# Patient Record
Sex: FEMALE | Race: OTHER | HISPANIC OR LATINO | URBAN - METROPOLITAN AREA
[De-identification: names, ages, dates, MRNs, and addresses within clinical notes are randomized per-mention and may not be internally consistent; named-entity substitution may affect disease eponyms.]

---

## 2019-12-31 ENCOUNTER — OUTPATIENT (OUTPATIENT)
Dept: OUTPATIENT SERVICES | Facility: HOSPITAL | Age: 19
LOS: 1 days | Discharge: HOME | End: 2019-12-31

## 2019-12-31 ENCOUNTER — APPOINTMENT (OUTPATIENT)
Dept: SPEECH THERAPY | Facility: CLINIC | Age: 19
End: 2019-12-31

## 2020-01-14 DIAGNOSIS — H90.3 SENSORINEURAL HEARING LOSS, BILATERAL: ICD-10-CM

## 2020-08-11 ENCOUNTER — APPOINTMENT (OUTPATIENT)
Dept: SURGERY | Facility: HOSPITAL | Age: 20
End: 2020-08-11

## 2020-09-08 ENCOUNTER — APPOINTMENT (OUTPATIENT)
Dept: SURGERY | Facility: HOSPITAL | Age: 20
End: 2020-09-08

## 2020-09-08 ENCOUNTER — OUTPATIENT (OUTPATIENT)
Dept: OUTPATIENT SERVICES | Facility: HOSPITAL | Age: 20
LOS: 1 days | End: 2020-09-08

## 2020-09-08 VITALS
HEART RATE: 74 BPM | SYSTOLIC BLOOD PRESSURE: 133 MMHG | BODY MASS INDEX: 23.19 KG/M2 | TEMPERATURE: 98.4 F | DIASTOLIC BLOOD PRESSURE: 64 MMHG | WEIGHT: 126 LBS | HEIGHT: 62 IN

## 2020-09-08 DIAGNOSIS — R10.12 LEFT UPPER QUADRANT PAIN: ICD-10-CM

## 2020-09-08 DIAGNOSIS — Z78.9 OTHER SPECIFIED HEALTH STATUS: ICD-10-CM

## 2020-09-08 DIAGNOSIS — R10.9 UNSPECIFIED ABDOMINAL PAIN: ICD-10-CM

## 2020-09-08 DIAGNOSIS — H90.5 UNSPECIFIED SENSORINEURAL HEARING LOSS: ICD-10-CM

## 2020-09-08 DIAGNOSIS — Q23.0 CONGENITAL STENOSIS OF AORTIC VALVE: ICD-10-CM

## 2021-01-27 ENCOUNTER — EMERGENCY (EMERGENCY)
Facility: HOSPITAL | Age: 21
LOS: 1 days | Discharge: HOME | End: 2021-01-27
Attending: EMERGENCY MEDICINE | Admitting: EMERGENCY MEDICINE
Payer: MEDICAID

## 2021-01-27 VITALS
OXYGEN SATURATION: 100 % | HEART RATE: 93 BPM | TEMPERATURE: 98 F | DIASTOLIC BLOOD PRESSURE: 79 MMHG | SYSTOLIC BLOOD PRESSURE: 154 MMHG | RESPIRATION RATE: 18 BRPM

## 2021-01-27 PROCEDURE — 99283 EMERGENCY DEPT VISIT LOW MDM: CPT | Mod: 25

## 2021-01-27 PROCEDURE — 56405 I&D VULVA/PERINEAL ABSCESS: CPT

## 2021-01-27 RX ORDER — CEPHALEXIN 500 MG
10 CAPSULE ORAL
Qty: 140 | Refills: 0
Start: 2021-01-27 | End: 2021-02-02

## 2021-01-27 RX ORDER — CEPHALEXIN 500 MG
500 CAPSULE ORAL ONCE
Refills: 0 | Status: DISCONTINUED | OUTPATIENT
Start: 2021-01-27 | End: 2021-01-27

## 2021-01-27 NOTE — ED ADULT TRIAGE NOTE - CHIEF COMPLAINT QUOTE
Pt c/o 2 cysts inside the vagina X2-3 days. Denies vaginal discharge & vaginal bleedings. Denies fevers, chills. Endorses pain.

## 2021-01-27 NOTE — ED PROVIDER NOTE - CLINICAL SUMMARY MEDICAL DECISION MAKING FREE TEXT BOX
minor cellulitis abscess will perform I&D, Yoselin sharma with approximately 5cc purulent discharge, dc with keflex antibiotics.

## 2021-01-27 NOTE — ED PROVIDER NOTE - OBJECTIVE STATEMENT
19 y/o F presents with c/o recurrent episodes of labia majora cyst with Hx of drainage. no signs of infection, never been sexual active, no vaginal discharge. pt also c/o tenderness over labia majora.

## 2021-01-27 NOTE — ED PROVIDER NOTE - PATIENT PORTAL LINK FT
You can access the FollowMyHealth Patient Portal offered by [facility] by registering at the following website: [email or web address]. By joining [employer]’s FollowMyHealth portal, you will also be able to view your health information using other applications (apps) compatible with our system.

## 2021-01-27 NOTE — ED ADULT NURSE NOTE - OBJECTIVE STATEMENT
Pt presents to ED ambulatory from home with c/o cyst on her labia x2-3 days. Pt denies vaginal discharge or bleeding. Pt relates they are painful. Pt denies chest pain, difficulty breathing, fever, cough, chills, shorntess of breath. MD at [facility] for I&D. Will continue to monitor. Pt presents to ED ambulatory from home with c/o cyst on her labia x2-3 days. Pt denies vaginal discharge or bleeding. Pt relates they are painful. Pt is hard of hearing and communicates by sign language. Pt relates this has happened once before approx 1 year ago.  Pt denies chest pain, difficulty breathing, fever, cough, chills, shorntess of breath. MD at [facility] for I&D. Will continue to monitor.

## 2021-01-27 NOTE — ED ADULT NURSE NOTE - NS ED NURSE LEVEL OF CONSCIOUSNESS MENTAL STATUS
Follow up. She is diong well generally.       Plan:  Obtain the puberty labs  Would give you a call with results and further management plan  Follow up in 4months or sooner if any vaginal bleed, rapid increase in pubic hair/axillary hair or rapid growth in height Awake/Alert/Cooperative

## 2021-04-01 ENCOUNTER — APPOINTMENT (OUTPATIENT)
Dept: CARDIOLOGY | Facility: CLINIC | Age: 21
End: 2021-04-01
Payer: MEDICAID

## 2021-04-01 VITALS
BODY MASS INDEX: 22.45 KG/M2 | WEIGHT: 122 LBS | RESPIRATION RATE: 14 BRPM | SYSTOLIC BLOOD PRESSURE: 136 MMHG | OXYGEN SATURATION: 100 % | DIASTOLIC BLOOD PRESSURE: 82 MMHG | HEART RATE: 80 BPM | HEIGHT: 62 IN | TEMPERATURE: 97.2 F

## 2021-04-01 PROCEDURE — 99072 ADDL SUPL MATRL&STAF TM PHE: CPT

## 2021-04-01 PROCEDURE — 93000 ELECTROCARDIOGRAM COMPLETE: CPT

## 2021-04-01 PROCEDURE — 99205 OFFICE O/P NEW HI 60 MIN: CPT

## 2021-04-15 ENCOUNTER — OUTPATIENT (OUTPATIENT)
Dept: OUTPATIENT SERVICES | Facility: HOSPITAL | Age: 21
LOS: 1 days | End: 2021-04-15

## 2021-04-15 ENCOUNTER — APPOINTMENT (OUTPATIENT)
Dept: CV DIAGNOSITCS | Facility: HOSPITAL | Age: 21
End: 2021-04-15
Payer: MEDICAID

## 2021-04-15 DIAGNOSIS — Z95.2 PRESENCE OF PROSTHETIC HEART VALVE: ICD-10-CM

## 2021-04-15 PROCEDURE — 93306 TTE W/DOPPLER COMPLETE: CPT | Mod: 26

## 2021-05-17 ENCOUNTER — APPOINTMENT (OUTPATIENT)
Dept: DISASTER EMERGENCY | Facility: OTHER | Age: 21
End: 2021-05-17

## 2021-08-19 ENCOUNTER — APPOINTMENT (OUTPATIENT)
Dept: CARDIOLOGY | Facility: CLINIC | Age: 21
End: 2021-08-19
Payer: MEDICAID

## 2021-08-19 ENCOUNTER — APPOINTMENT (OUTPATIENT)
Dept: RADIOLOGY | Facility: HOSPITAL | Age: 21
End: 2021-08-19

## 2021-08-19 ENCOUNTER — OUTPATIENT (OUTPATIENT)
Dept: OUTPATIENT SERVICES | Facility: HOSPITAL | Age: 21
LOS: 1 days | End: 2021-08-19
Payer: MEDICAID

## 2021-08-19 VITALS
RESPIRATION RATE: 16 BRPM | WEIGHT: 125.5 LBS | OXYGEN SATURATION: 98 % | HEART RATE: 71 BPM | BODY MASS INDEX: 23.1 KG/M2 | SYSTOLIC BLOOD PRESSURE: 116 MMHG | TEMPERATURE: 97 F | HEIGHT: 62 IN | DIASTOLIC BLOOD PRESSURE: 72 MMHG

## 2021-08-19 DIAGNOSIS — Z00.00 ENCOUNTER FOR GENERAL ADULT MEDICAL EXAMINATION WITHOUT ABNORMAL FINDINGS: ICD-10-CM

## 2021-08-19 PROCEDURE — 93000 ELECTROCARDIOGRAM COMPLETE: CPT

## 2021-08-19 PROCEDURE — 99214 OFFICE O/P EST MOD 30 MIN: CPT

## 2021-08-19 PROCEDURE — 71046 X-RAY EXAM CHEST 2 VIEWS: CPT | Mod: 26

## 2021-12-12 ENCOUNTER — EMERGENCY (EMERGENCY)
Facility: HOSPITAL | Age: 21
LOS: 1 days | Discharge: HOME | End: 2021-12-12
Attending: STUDENT IN AN ORGANIZED HEALTH CARE EDUCATION/TRAINING PROGRAM | Admitting: STUDENT IN AN ORGANIZED HEALTH CARE EDUCATION/TRAINING PROGRAM
Payer: MEDICAID

## 2021-12-12 VITALS
HEART RATE: 81 BPM | DIASTOLIC BLOOD PRESSURE: 73 MMHG | SYSTOLIC BLOOD PRESSURE: 116 MMHG | RESPIRATION RATE: 16 BRPM | TEMPERATURE: 98 F | OXYGEN SATURATION: 100 %

## 2021-12-12 PROCEDURE — 99284 EMERGENCY DEPT VISIT MOD MDM: CPT

## 2021-12-12 RX ORDER — ACETAMINOPHEN 500 MG
650 TABLET ORAL EVERY 6 HOURS
Refills: 0 | Status: DISCONTINUED | OUTPATIENT
Start: 2021-12-12 | End: 2021-12-15

## 2021-12-12 RX ORDER — IBUPROFEN 200 MG
600 TABLET ORAL ONCE
Refills: 0 | Status: COMPLETED | OUTPATIENT
Start: 2021-12-12 | End: 2021-12-12

## 2021-12-12 RX ADMIN — Medication 650 MILLIGRAM(S): at 14:58

## 2021-12-12 RX ADMIN — Medication 600 MILLIGRAM(S): at 14:59

## 2021-12-12 NOTE — ED PROVIDER NOTE - NSFOLLOWUPCLINICS_GEN_ALL_ED_FT
[facility] Gynecology and Obstetrics  Gynceology/OB  [address] [place] [address]  Phone: [phone]  Fax:   Follow Up Time: 1-3 Days

## 2021-12-12 NOTE — ED PROVIDER NOTE - PATIENT PORTAL LINK FT
You can access the FollowMyHealth Patient Portal offered by [facility] by registering at the following website: [email or web address]. By joining [email or web address]’s [email or web address] portal, you will also be able to view your health information using other applications (apps) compatible with our system.

## 2021-12-12 NOTE — ED ADULT NURSE NOTE - CHIEF COMPLAINT QUOTE
Pt presents to ED ambulatory from home with c/o vaginal cyst x 1 week. Pt is deaf,  used. Pt reports having the cyst 2 previous times in the past 3 years and had it drained. Pt reports pain to area.

## 2021-12-12 NOTE — ED PROVIDER NOTE - NS ED ROS FT
Review of Systems:  · Constitutional: no chills, no fever, no night sweats, no weight loss  · Nose: no epistaxis  · Mouth/Throat: no difficulty in swallowing, trachea midline, uvula midline  . Cardio: No CP, no palpitations, no chest pressure, no ripping chest pain  · Respiratory: no cough, no exertional dyspnea, no hemoptysis, no orthopnea, no shortness of breath  · Gastrointestinal: no abdominal pain, no diarrhea, no melena, no nausea, no vomiting  · Genitourinary: no difficulty urinating, no dysuria, no hematuria  · MUSCULOSKELETAL: FROM of all extremities  · Skin: abscess to R and L labia majora, no abrasion; no bruising; no laceration  · Neurological: no change in level of consciousness, no headache, no seizures  · ROS STATEMENT: all other ROS negative except as per HPI

## 2021-12-12 NOTE — ED PROVIDER NOTE - PROGRESS NOTE DETAILS
Pt reassessed at bedside, feels well, pain controlled. Informed of workup in ED, reviewed lab and/or radiology results (when applicable) with patient/caregiver. Informed pt of plan for discharge with instructions to follow up with PMD. Pt/caregiver expressed understanding of plan and agrees with plan for discharge. Strict return precautions discussed with patient in layman's terms, patient demonstrated understanding of return precautions. MD aware and agrees with plan. Benedicto Pritchett PA-C

## 2021-12-12 NOTE — ED ADULT TRIAGE NOTE - CHIEF COMPLAINT QUOTE
Pt presents to ED ambulatory from home with c/o vaginal cyst x 1 week. Pt reports having the cyst 2 previous times in the past 3 years and had it drained. Pt reports pain to area. Pt presents to ED ambulatory from home with c/o vaginal cyst x 1 week. Pt is deaf,  used. Pt reports having the cyst 2 previous times in the past 3 years and had it drained. Pt reports pain to area.

## 2021-12-12 NOTE — ED ADULT NURSE NOTE - OBJECTIVE STATEMENT
Pt received to intake AAO x 3 c/o cyst to the vaginal area x 1 week with worsening pain and increased swelling. Pt states she has PMH of vaginal cyst with 2 previous surg. Pt denies fever or chills and awaiting OB consult. Information obtained using ipad .

## 2021-12-12 NOTE — CONSULT NOTE ADULT - ASSESSMENT
INCOMPLETE NOTE      - Augmentin for 7 day course  - Pain medication as needed, ice packs, avoid tub baths and friction to area   - f/u in clinic within the week   - f/u Cultures  - d/c home from GYN perspective    seen and d/w Dr. Blayne Bronson PGY2 21y G0 virginal female LMP last week presents with 1 week of left vulvar abscess increasing in size and severity of pain. On exam abscess approximately 3cm, fluctuant and tender to palpation s/p bedside incision and drainage     - Augmentin for 7 day course  - Pain medication as needed, ice packs, avoid tub baths and friction to area   - f/u in clinic within the week   - f/u Cultures  - d/c home from GYN perspective    seen and d/w Dr. Blayne Bronson PGY2

## 2021-12-12 NOTE — CONSULT NOTE ADULT - SUBJECTIVE AND OBJECTIVE BOX
INCOMPLETE NOTE GYN Consult Note  ALS  #[id number] Pau and #[id number] Ana    HPI:  21y G0 virginal female LMP last week presents with 1 week of left vulvar abscess increasing in size and severity of pain. She reports that it is a 9/10 throbbing in nature. She has had 2 previous vulvar abscesses (once on the right and once on the left) s/p I&D each time - most recently in January. Denies fevers, headaches, CP, SOB, abdominal pain, vaginal discharge or abnormal vaginal bleeding.     OB/GYN HISTORY:   G0, virginal. Has never seen an OBGYN before    Last Menstrual Period: Last week. Regular monthly cycles. Denies h/o fibroids, cysts, abnormal paps, STIs     PAST MEDICAL & SURGICAL HISTORY:  aortic valve replacement   (tissue [facility] [place] 16 years ago, reop AVR 10 years ago and TAVR 3 years ago) and congenital hearing loss (s/p cochlear implant 2006)      REVIEW OF SYSTEMS  General: denies fevers, chills, tiredness  Skin/Breast: denies breast pain  Respiratory and Thorax: denies shortness of breath, denies cough  Cardiovascular: denies chest pain and denies palpitations  Gastrointestinal: denies abdominal pain, nausea/ vomiting	  Genitourinary: + vulvar abscess denies dysuria, increased urinary frequency, urgency	  Constitutional, Cardiovascular, Respiratory, Gastrointestinal, Genitourinary, Musculoskeletal and Integumentary review of systems are normal except as noted. 	    MEDICATIONS: Aspirin     MEDICATIONS  (PRN):  acetaminophen    Suspension .. 650 milliGRAM(s) Oral every 6 hours PRN Moderate Pain (4 - 6)    Allergies    No Known Allergies    SOCIAL HISTORY: Denies toxic habits, anxiety and depression       Vital Signs Last 24 Hrs  T(C): 36.4 (12 Dec 2021 11:59), Max: 36.4 (12 Dec 2021 11:59)  T(F): 97.6 (12 Dec 2021 11:59), Max: 97.6 (12 Dec 2021 11:59)  HR: 81 (12 Dec 2021 11:59) (81 - 81)  BP: 116/73 (12 Dec 2021 11:59) (116/73 - 116/73)  BP(mean): --  RR: 16 (12 Dec 2021 11:59) (16 - 16)  SpO2: 100% (12 Dec 2021 11:59) (100% - 100%)    PHYSICAL EXAM:   Gen: NAD, alert and oriented x 3  Cardiovascular: RRR  Respiratory: breathing comfortably on RA, CTABL   Abd: soft, non tender, non-distended  : Left 3cm vulvar abscess, fluctuant and tender to palpation with associated erythema   Extremities: NTBL  Skin: warm and well perfused      PROCEDURE:   Patient was pre-medicated with Motrin and Acetaminophen suspension   Left vulva was prepped with iodine solution, 2cc Lidocaine injected and 1cm vertical incision made. Cultures drawn, abscess expressed with cotton swab and thick dark red odorous fluid draining  Patient tolerated the procedure well    GYN Consult Note  ALS  #[id number] Pau and #[id number] Ana    HPI:  21y G0 virginal female LMP last week presents with 1 week of left vulvar abscess increasing in size and severity of pain. She reports that it is a 9/10 throbbing in nature. She has had 2 previous vulvar abscesses (once on the right and once on the left) s/p I&D each time - most recently in January. Denies fevers, headaches, CP, SOB, abdominal pain, vaginal discharge or abnormal vaginal bleeding.     OB/GYN HISTORY:   G0, virginal. Has never seen an OBGYN before    Last Menstrual Period: Last week. Regular monthly cycles. Denies h/o fibroids, cysts, abnormal paps, STIs     PAST MEDICAL & SURGICAL HISTORY:  aortic valve replacement   (tissue [facility] [place] 16 years ago, reop AVR 10 years ago and TAVR 3 years ago) and congenital hearing loss (s/p cochlear implant 2006)      REVIEW OF SYSTEMS  General: denies fevers, chills, tiredness  Skin/Breast: denies breast pain  Respiratory and Thorax: denies shortness of breath, denies cough  Cardiovascular: denies chest pain and denies palpitations  Gastrointestinal: denies abdominal pain, nausea/ vomiting	  Genitourinary: + vulvar abscess denies dysuria, increased urinary frequency, urgency	  Constitutional, Cardiovascular, Respiratory, Gastrointestinal, Genitourinary, Musculoskeletal and Integumentary review of systems are normal except as noted. 	    MEDICATIONS: Aspirin     MEDICATIONS  (PRN):  acetaminophen    Suspension .. 650 milliGRAM(s) Oral every 6 hours PRN Moderate Pain (4 - 6)    Allergies    No Known Allergies    SOCIAL HISTORY: Denies toxic habits, anxiety and depression       Vital Signs Last 24 Hrs  T(C): 36.4 (12 Dec 2021 11:59), Max: 36.4 (12 Dec 2021 11:59)  T(F): 97.6 (12 Dec 2021 11:59), Max: 97.6 (12 Dec 2021 11:59)  HR: 81 (12 Dec 2021 11:59) (81 - 81)  BP: 116/73 (12 Dec 2021 11:59) (116/73 - 116/73)  BP(mean): --  RR: 16 (12 Dec 2021 11:59) (16 - 16)  SpO2: 100% (12 Dec 2021 11:59) (100% - 100%)    PHYSICAL EXAM:   Gen: NAD, alert and oriented x 3  Cardiovascular: RRR  Respiratory: breathing comfortably on RA, CTABL   Abd: soft, non tender, non-distended  : Left 3cm vulvar abscess, fluctuant and tender to palpation with associated erythema   Extremities: NTBL  Skin: warm and well perfused      PROCEDURE:   Patient consented for bedside incision and drainage using ALS   Patient was pre-medicated with Motrin and Acetaminophen suspension   Left vulva was prepped with iodine solution, 2cc Lidocaine injected and 1cm vertical incision made. Cultures drawn, abscess expressed with cotton swab and thick dark red odorous fluid draining  Patient tolerated the procedure well

## 2021-12-12 NOTE — ED PROVIDER NOTE - OBJECTIVE STATEMENT
ASL [id number]: 21 year old fm with pmhx of "heart problem" unsure of exact condition presents with recurrent episodes of R labia majora abscess. patient reports now noticed on L side. patient reports first noticing it 2 weeks ago and reports it getting larger and more painful last few days.  never been sexual active, no vaginal discharge. pt also c/o tenderness over labia majora. patient denies chest pain, Shortness of Breath, abdominal pain, Nausea/Vomiting/Diarrhea, dizziness, weakness, confusion, vision changes, urinary symptoms, syncope, falls, trauma, discharge, bleeding, fevers

## 2021-12-12 NOTE — ED PROVIDER NOTE - ATTENDING CONTRIBUTION TO CARE
I have personally performed a face to face medical and diagnostic evaluation of the patient. I have discussed with and reviewed the ACP's note and agree with the History, ROS, Physical Exam and MDM unless otherwise indicated. A brief summary of my personal evaluation and impression can be found below.    21F hx of "heart problem", prior labial abscesses presents with a cc of L labia swelling over last x2 weeks, getting larger and more painful over last few days, denies any prior sexual activity, declines STD testing this episode is similar episodes of abscess requiring "surgery". No other complaints. Denies n/v/f/c/cp/sob. Denies headache, syncope, lightheadedness, dizziness. Denies chest palpitations, abdominal pain. Denies edema. Denies dysuria, hematuria, BRBPR, tarry stools, diarrhea, constipation.     All other ROS negative, except as above and as per HPI and ROS section.    VITALS: Initial triage and subsequent vitals have been reviewed by me.  GEN APPEARANCE: WDWN, alert, non-toxic, NAD  HEAD: Atraumatic.  EYES: PERRLa, EOMI, vision grossly intact.   NECK: Supple  CV: RRR, S1S2, no c/r/m/g. Cap refill <2 seconds. No bruits.   LUNGS: CTAB. No abnormal breath sounds.  ABDOMEN: Soft, NTND. No guarding or rebound.   MSK/EXT: No spinal or extremity point tenderness. No CVA ttp. Pelvis stable. No peripheral edema.  NEURO: Alert, follows commands. Weight bearing normal. Speech normal. Sensation and motor normal x4 extremities.   SKIN: Warm, dry and intact. No rash.  PSYCH: Appropriate  : As above     Plan/MDM:   21F hx of "heart problem", prior labial abscesses presents with a cc of L labia swelling over last x2 weeks, getting larger and more painful over last few days, denies prior sexual activity, declines STD testing this episode is similar episodes of abscess requiring "surgery". No other complaints.  exam vss non toxic PE as above ddx c/f likely labial abscess/cyst will discuss w gyn meds as needed and reassess.

## 2021-12-12 NOTE — ED PROVIDER NOTE - NSFOLLOWUPINSTRUCTIONS_ED_ALL_ED_FT
There are no signs of emergency conditions requiring admission to the hospital on today's workup.  Based on the evaluation, a presumptive diagnosis was made, however, further evaluation may be required by your primary care physician or a specialist for a more definitive diagnosis.  Therefore, please follow-up as directed or return to the Emergency Department if your symptoms change or worsen.    We recommend that you:  1. See your primary care physician within the next 72 hours for follow up.  Bring a copy of your discharge paperwork (including any test results) to your doctor.  2.  3.  4.      *** Return immediately if you have worsening symptoms, chest pain, Shortness of Breath, abdominal pain, Nausea/Vomiting/Diarrhea, dizziness, weakness, confusion, severe headache, vision changes, urinary symptoms, syncope, falls, trauma, discharge, bleeding, fevers, or any other new/concerning symptoms. *** There are no signs of emergency conditions requiring admission to the hospital on today's workup.  Based on the evaluation, a presumptive diagnosis was made, however, further evaluation may be required by your primary care physician or a specialist for a more definitive diagnosis.  Therefore, please follow-up as directed or return to the Emergency Department if your symptoms change or worsen.    We recommend that you:  1. See your primary care physician within the next 72 hours for follow up.  Bring a copy of your discharge paperwork (including any test results) to your doctor.  2. Please take 875mg of Augmentin twice a day  for 7 days.         *** Return immediately if you have worsening symptoms, chest pain, Shortness of Breath, abdominal pain, Nausea/Vomiting/Diarrhea, dizziness, weakness, confusion, severe headache, vision changes, urinary symptoms, syncope, falls, trauma, discharge, bleeding, fevers, or any other new/concerning symptoms. ***

## 2021-12-12 NOTE — ED PROVIDER NOTE - PHYSICAL EXAMINATION
On Physical Exam:  General: well appearing, in NAD, speaking clearly in full sentences and without difficulty; cooperative with exam  HEENT: PERRL, MMM  Neck: no neck tenderness, no nuchal rigidity  Cardiac: normal s1, s2; RRR; no MGR  Lungs: CTABL  Abdomen: soft nontender/nondistended  : 4cm flucuant abcess to L majora 1cm abscess to R majora, chaperone Comfort GLASS, no bladder tenderness or distension  Skin: warm, intact, no rash  Extremities: no peripheral edema, no gross deformities  Neuro: no gross neurologic deficits

## 2021-12-12 NOTE — ED PROVIDER NOTE - CLINICAL SUMMARY MEDICAL DECISION MAKING FREE TEXT BOX
21 year old fm with pmhx of "heart problem" unsure of exact condition presents with recurrent episodes of R labia majora abscess. patient reports now noticed on L side. Contact GYN, no BW no fevers, chills, no streaking, no induration, reassess, pain management, likel;y DC with abx and GYN F/U.

## 2021-12-15 ENCOUNTER — APPOINTMENT (OUTPATIENT)
Dept: OBGYN | Facility: HOSPITAL | Age: 21
End: 2021-12-15

## 2021-12-15 LAB
CULTURE RESULTS: SIGNIFICANT CHANGE UP
SPECIMEN SOURCE: SIGNIFICANT CHANGE UP

## 2021-12-17 NOTE — CHART NOTE - NSCHARTNOTEFT_GEN_A_CORE
R2 Chart Note     Pathology was reviewed from 12/12( vulvar abscess)     Few actinomyces Urogenitalis as well as numerous peptostreptococcus. No susceptibilities were performed  . Patient sent home on Augmentin x 7 days   Email sent to clinic to schedule for follow up appointment     Amanda Morales , PGY 2

## 2022-03-01 ENCOUNTER — EMERGENCY (EMERGENCY)
Facility: HOSPITAL | Age: 22
LOS: 1 days | Discharge: HOME | End: 2022-03-01
Attending: STUDENT IN AN ORGANIZED HEALTH CARE EDUCATION/TRAINING PROGRAM | Admitting: STUDENT IN AN ORGANIZED HEALTH CARE EDUCATION/TRAINING PROGRAM
Payer: MEDICAID

## 2022-03-01 VITALS
HEART RATE: 76 BPM | DIASTOLIC BLOOD PRESSURE: 74 MMHG | OXYGEN SATURATION: 100 % | TEMPERATURE: 99 F | RESPIRATION RATE: 18 BRPM | SYSTOLIC BLOOD PRESSURE: 127 MMHG

## 2022-03-01 PROCEDURE — 99284 EMERGENCY DEPT VISIT MOD MDM: CPT

## 2022-03-01 RX ORDER — SODIUM CHLORIDE 9 MG/ML
1000 INJECTION INTRAMUSCULAR; INTRAVENOUS; SUBCUTANEOUS ONCE
Refills: 0 | Status: COMPLETED | OUTPATIENT
Start: 2022-03-01 | End: 2022-03-01

## 2022-03-01 RX ORDER — METOCLOPRAMIDE HCL 10 MG
10 TABLET ORAL ONCE
Refills: 0 | Status: COMPLETED | OUTPATIENT
Start: 2022-03-01 | End: 2022-03-01

## 2022-03-01 RX ORDER — KETOROLAC TROMETHAMINE 30 MG/ML
15 SYRINGE (ML) INJECTION ONCE
Refills: 0 | Status: DISCONTINUED | OUTPATIENT
Start: 2022-03-01 | End: 2022-03-01

## 2022-03-01 RX ADMIN — SODIUM CHLORIDE 1000 MILLILITER(S): 9 INJECTION INTRAMUSCULAR; INTRAVENOUS; SUBCUTANEOUS at 14:03

## 2022-03-01 RX ADMIN — Medication 10 MILLIGRAM(S): at 14:03

## 2022-03-01 RX ADMIN — Medication 15 MILLIGRAM(S): at 14:56

## 2022-03-01 NOTE — ED PROVIDER NOTE - ATTENDING CONTRIBUTION TO CARE
I performed a face-to-face evaluation of the patient and performed a history and physical examination. I agree with the history and physical examination. If this was a PA visit, I personally saw the patient with the PA and performed a substantive portion of the visit including all aspects of the medical decision making.    22 y/o female pmh deafness s/p cochlear implant c/o headache x 3 days. Pt admits to posterior headache, gradual onset, no aggravating or relieving factors. Pt admits to similar headache in the past. Pt took motrin x1 day ago with little relief. Pt states that the pain radiates into her neck. Pt returned from the [place] x3 days ago and pain started when she got home. Pt denies chest pain, sob, abd pain, n/v/d, numbness, tingling, weakness, syncope, change in vision, ear pain or drainage, fever or chills.  pt well appeairng, no distress, neuro exam intact, will try reglan, if persists consider c \t imaging

## 2022-03-01 NOTE — ED ADULT NURSE NOTE - NSIMPLEMENTINTERV_GEN_ALL_ED
Implemented All Universal Safety Interventions:  [place] to call system. Call bell, personal items and telephone within reach. Instruct patient to call for assistance. Room bathroom lighting operational. Non-slip footwear when patient is off stretcher. Physically safe environment: no spills, clutter or unnecessary equipment. Stretcher in lowest position, wheels locked, appropriate side rails in place.

## 2022-03-01 NOTE — ED PROVIDER NOTE - CLINICAL SUMMARY MEDICAL DECISION MAKING FREE TEXT BOX
22 y/o female c/o headache x 3 days- no neuro deficits, no red flags, cochlear implants working well. will control pain and reassess.

## 2022-03-01 NOTE — ED ADULT TRIAGE NOTE - CHIEF COMPLAINT QUOTE
Pt needs  with cochlear implant . pt arrives with co pain to back of head x 3 days with NO nausea or vomiting. Pt denies feeling dizzy . Pt denies any fever or chills no sick contact.

## 2022-03-01 NOTE — ED PROVIDER NOTE - NORMAL, MLM
eddie all pertinent systems normal Bilobed Transposition Flap Text: The defect edges were debeveled with a #15c scalpel blade.  Given the location of the defect and the proximity to free margins a bilobed transposition flap was deemed most appropriate.  Using a sterile surgical marker, an appropriate bilobe flap drawn around the defect.    The area thus outlined was incised deep to adipose tissue with a #15 scalpel blade.  The skin margins were undermined to an appropriate distance in all directions utilizing iris scissors.

## 2022-03-01 NOTE — ED PROVIDER NOTE - PATIENT PORTAL LINK FT
You can access the FollowMyHealth Patient Portal offered by [facility] by registering at the following website: [email or web address]. By joining [employer]’s FollowMyHealth portal, you will also be able to view your health information using other applications (apps) compatible with our system.

## 2022-03-01 NOTE — ED PROVIDER NOTE - OBJECTIVE STATEMENT
22 y/o female pmh deafness s/p cochlear implant c/o headache x 3 days. Pt admits to posterior headache, gradual onset, no aggravating or relieving factors. Pt admits to similar headache in the past. Pt took motrin x1 day ago with little relief. Pt states that the pain radiates into her neck. Pt returned from the [place] x3 days ago and pain started when she got home. Pt denies chest pain, sob, abd pain, n/v/d, numbness, tingling, weakness, syncope, change in vision, ear pain or drainage, fever or chills.

## 2022-03-02 ENCOUNTER — TRANSCRIPTION ENCOUNTER (OUTPATIENT)
Age: 22
End: 2022-03-02

## 2022-04-05 ENCOUNTER — APPOINTMENT (OUTPATIENT)
Dept: SPEECH THERAPY | Facility: CLINIC | Age: 22
End: 2022-04-05

## 2022-04-05 ENCOUNTER — OUTPATIENT (OUTPATIENT)
Dept: OUTPATIENT SERVICES | Facility: HOSPITAL | Age: 22
LOS: 1 days | Discharge: HOME | End: 2022-04-05

## 2022-04-05 ENCOUNTER — APPOINTMENT (OUTPATIENT)
Dept: OTOLARYNGOLOGY | Facility: CLINIC | Age: 22
End: 2022-04-05
Payer: MEDICAID

## 2022-04-05 DIAGNOSIS — R51.9 HEADACHE, UNSPECIFIED: ICD-10-CM

## 2022-04-05 DIAGNOSIS — H90.3 SENSORINEURAL HEARING LOSS, BILATERAL: ICD-10-CM

## 2022-04-05 PROCEDURE — 92504 EAR MICROSCOPY EXAMINATION: CPT

## 2022-04-05 PROCEDURE — 99213 OFFICE O/P EST LOW 20 MIN: CPT

## 2022-04-07 NOTE — PROCEDURE
[Other ___] : [unfilled] [Same] : same as the Pre Op Dx. [] : Binocular Microscopy [FreeTextEntry1] : R [FreeTextEntry4] : none [FreeTextEntry6] : Operative microscope was used to examine the ear canal, ear drum and visible middle ear landmarks. Adequate exam would not have been possible without the use of a microscope. Findings are described.

## 2022-04-07 NOTE — HISTORY OF PRESENT ILLNESS
[de-identified] : 21 year old female former patient of Dr. Lee presents for initial evaluation for migraine headaches. \par Reports that she is getting them every day sometimes more then one time every day. \par Reports that she had some right otalgia where the implant is within the past month.\par Reports that it is a pulsating feeling that shoots down the back of right ear down her neck to the back of her shoulder.\par Reports she has been taking OTC headache medication that has not been helping her. \par Reports she is having difficulty clicking into her right implant as well.\par Reports she is having a hard time sleeping at night due to pain. \par Reports once in a while she gets dizzy--has not happened in the past month.\par Reports she just got mapped today by [employer].\par Denies otorrhea, tinnitus, dizziness, vertigo, changes in hearing.\par Denies recent fevers or infections.

## 2022-04-07 NOTE — PHYSICAL EXAM
[Binocular Microscopic Exam] : Binocular microscopic exam was performed [Rinne Test Air Conduction Persists > Bone Conduction Right] : air conduction greater than bone conduction on the right [Rinne Test Air Conduction Persists > Bone Conduction Left] : air conduction greater than bone conduction on the left [Hearing Davis Test (Tuning Fork On Forehead)] : no lateralization of tone [Midline] : trachea located in midline position [Normal] : no rashes [Hearing Loss Right Only] : normal [Hearing Loss Left Only] : normal [Nystagmus] : ~T no ~M nystagmus was seen [Fukuda Step Test] : Fukuda Step Test was Negative [Romberg's Sign] : Romberg's sign was absent [Fistula Sign] : Fistula Sign: Negative [Past-Pointing] : Past-Pointing: Negative [Cheikh-Hallmarvelke] : [place]-Hallpike: Negative

## 2022-04-07 NOTE — REASON FOR VISIT
[Initial Evaluation] : an initial evaluation for [Other: _____] : [unfilled] [FreeTextEntry2] : migraine headaches [Interpreters_FullName] : Clyde Chapman [TWNoteComboBox1] : American Sign Language

## 2022-04-11 DIAGNOSIS — H90.3 SENSORINEURAL HEARING LOSS, BILATERAL: ICD-10-CM

## 2022-04-20 ENCOUNTER — NON-APPOINTMENT (OUTPATIENT)
Age: 22
End: 2022-04-20

## 2022-04-22 ENCOUNTER — APPOINTMENT (OUTPATIENT)
Dept: CV DIAGNOSITCS | Facility: HOSPITAL | Age: 22
End: 2022-04-22

## 2022-04-25 ENCOUNTER — NON-APPOINTMENT (OUTPATIENT)
Age: 22
End: 2022-04-25

## 2022-04-25 ENCOUNTER — APPOINTMENT (OUTPATIENT)
Dept: PAIN MANAGEMENT | Facility: CLINIC | Age: 22
End: 2022-04-25
Payer: MEDICAID

## 2022-04-25 VITALS
BODY MASS INDEX: 23.92 KG/M2 | WEIGHT: 130 LBS | SYSTOLIC BLOOD PRESSURE: 135 MMHG | HEART RATE: 78 BPM | HEIGHT: 62 IN | DIASTOLIC BLOOD PRESSURE: 73 MMHG

## 2022-04-25 PROCEDURE — 99204 OFFICE O/P NEW MOD 45 MIN: CPT

## 2022-04-25 RX ORDER — NAPROXEN ORAL 125 MG/5ML
125 SUSPENSION ORAL
Qty: 50 | Refills: 0 | Status: ACTIVE | COMMUNITY
Start: 2022-04-25 | End: 1900-01-01

## 2022-04-25 NOTE — ASSESSMENT
[FreeTextEntry1] : 21 year old with chronic daily headache. \par Trial of short course of NSAID , pt requests liquid form as she has great difficulty swallowing pills.\par RTO 3-4 weeks. \par \par Consider Topamax sprinkles. \par Consider Topical cream \par \par \par Dr Ravi on site , billed incident to service

## 2022-04-25 NOTE — PHYSICAL EXAM
[General Appearance - Alert] : alert [General Appearance - Well-Appearing] : healthy appearing [Oriented To Time, Place, And Person] : oriented to person, place, and time [Affect] : the affect was normal [Mood] : the mood was normal [Cranial Nerves Facial (VII)] : face symmetrical [Cranial Nerves Accessory (XI - Cranial And Spinal)] : head turning and shoulder shrug symmetric [Cranial Nerves Hypoglossal (XII)] : there was no tongue deviation with protrusion [Paresis Pronator Drift Right-Sided] : no pronator drift on the right [Paresis Pronator Drift Left-Sided] : no pronator drift on the left [Motor Strength Upper Extremities Bilaterally] : strength was normal in both upper extremities [Coordination - Dysmetria Impaired Finger-to-Nose Bilateral] : not present [Motor Strength Lower Extremities Bilaterally] : strength was normal in both lower extremities [2+] : Patella left 2+ [FreeTextEntry5] : pt is deaf [Sclera] : the sclera and conjunctiva were normal [PERRL With Normal Accommodation] : pupils were equal in size, round, reactive to light, with normal accommodation [] : no respiratory distress

## 2022-04-25 NOTE — HISTORY OF PRESENT ILLNESS
[FreeTextEntry1] : pt is 21 year old woman with a history of congenital hearing loss, aortic valve replacement. \par Pt has a right cochlear implant and a pacemaker that is "turned  Off". \par Pt explains in February she was on the way home from the [place] and a headache on the right side of head and neck started. Her mother gave her something (maybe Advil ) but it did not help.\par Pt reports she "waited " for it to go away but after several weeks it continued. \par Pt reports it is worse with activity and standing up .\par SHe denies sensitivity to light, nausea /vomiting . She had dizziness 1x that resolved. \par Ms. Antoine denies caffeine intake , eats well and drinks water throughout the day. SHe reports sleeping well.\par SHe attends college and also works. Pt denies pregnancy. Pain today is 4/10. \par Menstrual cycle does not influence headache. \par There is no headache hx in her family.

## 2022-04-27 ENCOUNTER — OUTPATIENT (OUTPATIENT)
Dept: OUTPATIENT SERVICES | Facility: HOSPITAL | Age: 22
LOS: 1 days | End: 2022-04-27

## 2022-04-27 ENCOUNTER — APPOINTMENT (OUTPATIENT)
Dept: CV DIAGNOSITCS | Facility: HOSPITAL | Age: 22
End: 2022-04-27
Payer: MEDICAID

## 2022-04-27 DIAGNOSIS — Z00.00 ENCOUNTER FOR GENERAL ADULT MEDICAL EXAMINATION WITHOUT ABNORMAL FINDINGS: ICD-10-CM

## 2022-04-27 PROCEDURE — 93306 TTE W/DOPPLER COMPLETE: CPT | Mod: 26

## 2022-04-28 ENCOUNTER — APPOINTMENT (OUTPATIENT)
Dept: CARDIOLOGY | Facility: CLINIC | Age: 22
End: 2022-04-28
Payer: MEDICAID

## 2022-04-28 VITALS
HEART RATE: 81 BPM | DIASTOLIC BLOOD PRESSURE: 76 MMHG | TEMPERATURE: 97 F | OXYGEN SATURATION: 98 % | HEIGHT: 62 IN | RESPIRATION RATE: 16 BRPM | WEIGHT: 130 LBS | BODY MASS INDEX: 23.92 KG/M2 | SYSTOLIC BLOOD PRESSURE: 126 MMHG

## 2022-04-28 PROCEDURE — 99214 OFFICE O/P EST MOD 30 MIN: CPT

## 2022-04-28 PROCEDURE — 93000 ELECTROCARDIOGRAM COMPLETE: CPT

## 2022-05-23 ENCOUNTER — APPOINTMENT (OUTPATIENT)
Dept: PAIN MANAGEMENT | Facility: CLINIC | Age: 22
End: 2022-05-23

## 2022-05-26 ENCOUNTER — EMERGENCY (EMERGENCY)
Facility: HOSPITAL | Age: 22
LOS: 1 days | Discharge: HOME | End: 2022-05-26
Attending: EMERGENCY MEDICINE | Admitting: EMERGENCY MEDICINE
Payer: MEDICAID

## 2022-05-26 VITALS
SYSTOLIC BLOOD PRESSURE: 108 MMHG | HEART RATE: 83 BPM | TEMPERATURE: 97 F | DIASTOLIC BLOOD PRESSURE: 72 MMHG | RESPIRATION RATE: 16 BRPM | OXYGEN SATURATION: 100 %

## 2022-05-26 LAB
ALBUMIN SERPL ELPH-MCNC: 4.8 G/DL — SIGNIFICANT CHANGE UP (ref 3.3–5)
ALP SERPL-CCNC: 72 U/L — SIGNIFICANT CHANGE UP (ref 40–120)
ALT FLD-CCNC: 9 U/L — SIGNIFICANT CHANGE UP (ref 4–33)
ANION GAP SERPL CALC-SCNC: 10 MMOL/L — SIGNIFICANT CHANGE UP (ref 7–14)
AST SERPL-CCNC: 14 U/L — SIGNIFICANT CHANGE UP (ref 4–32)
BASOPHILS # BLD AUTO: 0.01 K/UL — SIGNIFICANT CHANGE UP (ref 0–0.2)
BASOPHILS NFR BLD AUTO: 0.1 % — SIGNIFICANT CHANGE UP (ref 0–2)
BILIRUB SERPL-MCNC: 0.6 MG/DL — SIGNIFICANT CHANGE UP (ref 0.2–1.2)
BUN SERPL-MCNC: 15 MG/DL — SIGNIFICANT CHANGE UP (ref 7–23)
CALCIUM SERPL-MCNC: 9.5 MG/DL — SIGNIFICANT CHANGE UP (ref 8.4–10.5)
CHLORIDE SERPL-SCNC: 102 MMOL/L — SIGNIFICANT CHANGE UP (ref 98–107)
CO2 SERPL-SCNC: 23 MMOL/L — SIGNIFICANT CHANGE UP (ref 22–31)
CREAT SERPL-MCNC: 0.83 MG/DL — SIGNIFICANT CHANGE UP (ref 0.5–1.3)
D DIMER BLD IA.RAPID-MCNC: 153 NG/ML DDU — SIGNIFICANT CHANGE UP
EGFR: 102 ML/MIN/1.73M2 — SIGNIFICANT CHANGE UP
EOSINOPHIL # BLD AUTO: 0.04 K/UL — SIGNIFICANT CHANGE UP (ref 0–0.5)
EOSINOPHIL NFR BLD AUTO: 0.6 % — SIGNIFICANT CHANGE UP (ref 0–6)
GLUCOSE SERPL-MCNC: 93 MG/DL — SIGNIFICANT CHANGE UP (ref 70–99)
HCG UR QL: NEGATIVE — SIGNIFICANT CHANGE UP
HCT VFR BLD CALC: 39.3 % — SIGNIFICANT CHANGE UP (ref 34.5–45)
HGB BLD-MCNC: 14.1 G/DL — SIGNIFICANT CHANGE UP (ref 11.5–15.5)
IANC: 4.35 K/UL — SIGNIFICANT CHANGE UP (ref 1.8–7.4)
IMM GRANULOCYTES NFR BLD AUTO: 0.3 % — SIGNIFICANT CHANGE UP (ref 0–1.5)
LYMPHOCYTES # BLD AUTO: 2.29 K/UL — SIGNIFICANT CHANGE UP (ref 1–3.3)
LYMPHOCYTES # BLD AUTO: 31.6 % — SIGNIFICANT CHANGE UP (ref 13–44)
MCHC RBC-ENTMCNC: 31 PG — SIGNIFICANT CHANGE UP (ref 27–34)
MCHC RBC-ENTMCNC: 35.9 GM/DL — SIGNIFICANT CHANGE UP (ref 32–36)
MCV RBC AUTO: 86.4 FL — SIGNIFICANT CHANGE UP (ref 80–100)
MONOCYTES # BLD AUTO: 0.54 K/UL — SIGNIFICANT CHANGE UP (ref 0–0.9)
MONOCYTES NFR BLD AUTO: 7.4 % — SIGNIFICANT CHANGE UP (ref 2–14)
NEUTROPHILS # BLD AUTO: 4.35 K/UL — SIGNIFICANT CHANGE UP (ref 1.8–7.4)
NEUTROPHILS NFR BLD AUTO: 60 % — SIGNIFICANT CHANGE UP (ref 43–77)
NRBC # BLD: 0 /100 WBCS — SIGNIFICANT CHANGE UP
NRBC # FLD: 0 K/UL — SIGNIFICANT CHANGE UP
PLATELET # BLD AUTO: 110 K/UL — LOW (ref 150–400)
POTASSIUM SERPL-MCNC: 4.1 MMOL/L — SIGNIFICANT CHANGE UP (ref 3.5–5.3)
POTASSIUM SERPL-SCNC: 4.1 MMOL/L — SIGNIFICANT CHANGE UP (ref 3.5–5.3)
PROT SERPL-MCNC: 7.2 G/DL — SIGNIFICANT CHANGE UP (ref 6–8.3)
RBC # BLD: 4.55 M/UL — SIGNIFICANT CHANGE UP (ref 3.8–5.2)
RBC # FLD: 11.9 % — SIGNIFICANT CHANGE UP (ref 10.3–14.5)
SODIUM SERPL-SCNC: 135 MMOL/L — SIGNIFICANT CHANGE UP (ref 135–145)
TROPONIN T, HIGH SENSITIVITY RESULT: <6 NG/L — SIGNIFICANT CHANGE UP
WBC # BLD: 7.25 K/UL — SIGNIFICANT CHANGE UP (ref 3.8–10.5)
WBC # FLD AUTO: 7.25 K/UL — SIGNIFICANT CHANGE UP (ref 3.8–10.5)

## 2022-05-26 PROCEDURE — 71046 X-RAY EXAM CHEST 2 VIEWS: CPT | Mod: 26

## 2022-05-26 PROCEDURE — 93010 ELECTROCARDIOGRAM REPORT: CPT

## 2022-05-26 PROCEDURE — 99285 EMERGENCY DEPT VISIT HI MDM: CPT | Mod: 25

## 2022-05-26 PROCEDURE — 93308 TTE F-UP OR LMTD: CPT | Mod: 26

## 2022-05-26 RX ORDER — LIDOCAINE 4 G/100G
1 CREAM TOPICAL ONCE
Refills: 0 | Status: COMPLETED | OUTPATIENT
Start: 2022-05-26 | End: 2022-05-26

## 2022-05-26 RX ORDER — KETOROLAC TROMETHAMINE 30 MG/ML
15 SYRINGE (ML) INJECTION ONCE
Refills: 0 | Status: DISCONTINUED | OUTPATIENT
Start: 2022-05-26 | End: 2022-05-26

## 2022-05-26 RX ADMIN — LIDOCAINE 1 PATCH: 4 CREAM TOPICAL at 11:53

## 2022-05-26 RX ADMIN — Medication 15 MILLIGRAM(S): at 11:53

## 2022-05-26 NOTE — ED PROVIDER NOTE - NS ED ATTENDING STATEMENT MOD
I have seen and examined this patient and fully participated in the care of this patient as the teaching attending.  The service was shared with the MILLER.  I reviewed and verified the documentation and independently performed the documented:

## 2022-05-26 NOTE — ED PROVIDER NOTE - PATIENT PORTAL LINK FT
You can access the FollowMyHealth Patient Portal offered by [facility] by registering at the following website: [email or web address]. By joining [facility]’s FollowMyHealth portal, you will also be able to view your health information using other applications (apps) compatible with our system.

## 2022-05-26 NOTE — ED PROVIDER NOTE - PROGRESS NOTE DETAILS
Aracelis Fierro DO (PGY1): Labs nonactionable. D-dimer negative. ED echo showing no RV strain or dilation. Plan for dc and outpt cardiology for official echo and further workup. Aracelis Fierro DO (PGY1): Labs nonactionable. D-dimer negative. ED echo showing no RV strain or dilation. Plan for dc and outpt cardiology for official echo and further workup. Pt made aware of lab and imaging results. Questions regarding their symptoms were addressed. Advised to follow up with cardiology and pcp. Given strict return precautions. Pt verbalized understanding.

## 2022-05-26 NOTE — ED PROVIDER NOTE - PHYSICAL EXAMINATION
GENERAL: Awake. Alert. NAD. Well nourished.  HEENT: NC/AT, Conjunctiva pink, no scleral icterus. Airway patent. Moist mucous membranes.  LUNGS: CTAB. No wheezes or rales noted.  CARDIAC: Chest non-tender to palpation. RRR.  ABDOMEN: No masses noted. Soft, NT, ND, no rebound, no guarding.  EXT: No edema, no calf tenderness, distal pulses 2+ bilaterally  NEURO: A&Ox3. Moving all extremities. Sensation and strength intact throughout.   SKIN: Warm and dry.  PSYCH: Normal affect.

## 2022-05-26 NOTE — ED PROVIDER NOTE - CLINICAL SUMMARY MEDICAL DECISION MAKING FREE TEXT BOX
21F PMH congenital deafness s/p cochlear implant, TAVR not on AC presenting with 1 week of intermittent nonexertional nonradiating nonpleuritic L sided chest pain and episode of SOB while driving in the warm car yesterday. Given hx and physical, ddx includes musculoskeletal pain vs ACS vs asthma. Low concern for PE at this time given low wells, pt perc negative. Given hx of TAVR not on AC and RBBB on ecg will obtain dimer. Plan for labs, cxr, echo and reassess

## 2022-05-26 NOTE — ED PROVIDER NOTE - NSFOLLOWUPINSTRUCTIONS_ED_ALL_ED_FT
Please follow up with cardiologist and primary care doctor, the hospital will call you to help set up an appointment.    Your results are attached to this document, please bring them to your appointment.    Chest Pain    Chest pain can be caused by many different conditions which may or may not be dangerous. Causes include heartburn, lung infections, heart attack, blood clot in lungs, skin infections, strain or damage to muscle, cartilage, or bones, etc. In addition to a history and physical examination, an electrocardiogram (ECG) or other lab tests may have been performed to determine the cause of your chest pain. Follow up with your primary care provider or with a cardiologist as instructed.       SEEK IMMEDIATE MEDICAL CARE IF YOU HAVE ANY OF THE FOLLOWING SYMPTOMS: worsening chest pain, coughing up blood, unexplained back/neck/jaw pain, severe abdominal pain, dizziness or lightheadedness, fainting, shortness of breath, sweaty or clammy skin, vomiting, or racing heart beat. These symptoms may represent a serious problem that is an emergency. Do not wait to see if the symptoms will go away. Get medical help right away. Call 911 and do not drive yourself to the hospital.

## 2022-05-26 NOTE — ED PROVIDER NOTE - OBJECTIVE STATEMENT
21F PMH congenital deafness s/p cochlear implant, TAVR not on AC presenting with 1 week of intermittent nonexertional nonradiating nonpleuritic L sided chest pain and episode of SOB while driving in the warm car yesterday. Pt denies n/v, diaphoresis, syncope, calf pain/swelling, cough, fever, chills. Jarrod hormonal OCP use. Denies personal or family hx of blood clots. Pt works as [employer] .

## 2022-05-26 NOTE — ED ADULT NURSE NOTE - OBJECTIVE STATEMENT
received pt in intake room 7, 21 yr/o female A+Ox4, deft  used. present to the ED C/O intermittent chest pain for 1 week .  VSS, denies SOB, RR even and unlabored. left AC 20g placed, labs drawn and sent. medications given as ordered. will continue to monitor.

## 2022-05-26 NOTE — ED PROVIDER NOTE - NSPTACCESSSVCSAPPTDETAILS_ED_ALL_ED_FT
Needs urgent cardiology appointment with Dr. Deonte Warner for further testing and echo. Needs routine PCP appointment

## 2022-05-26 NOTE — ED PROVIDER NOTE - ATTENDING CONTRIBUTION TO CARE
Brief HPI:  21 F PMH congenital deafness s/p cochlear implant, TAVR not on AC presenting with 1 week of intermittent nonexertional nonradiating nonpleuritic L sided chest pain and sob while driving.  Denies fever, chills, cough, hemoptysis, leg swelling.  Non-smoker.     Vitals:   Reviewed    Exam:    GEN:  Non-toxic appearing, non-distressed, speaking full sentences, non-diaphoretic, AAOx3  HEENT:  NCAT, neck supple, EOMI, PERRLA, sclera anicteric, no conjunctival pallor or injection, no stridor, normal voice, no tonsillar exudate, uvula midline  CV:  regular rhythm and rate, s1/s2 audible, no murmurs, rubs or gallops, peripheral pulses 2+ and symmetric  PULM:  non-labored respirations, lungs clear to auscultation bilaterally, no wheezes, crackles or rales  ABD:  non distended, non-tender, no rebound, no guarding, negative Brower's sign, bowel sounds normal, no cvat  MSK:  no gross deformity, non-tender extremities and joints, range of motion grossly normal appearing, no extremity edema, extremities warm and well perfused   NEURO:  AAOx3, CN II-XII intact, motor 5/5 in upper and lower extremities bilaterally, sensation grossly intact in extremities and trunk, finger to nose testing wnl, no nystagmus, negative Romberg, no pronator drift, no gait deficit  SKIN:  warm, dry, no rash or vesicles     A/P:  21 F [place] congenital deafness s/p cochlear implant, TAVR not on AC presenting with 1 week of intermittent nonexertional nonradiating nonpleuritic L sided chest pain and sob while driving.  VSS. EKG non-ischemic. Low concern for acute coronary syndrome as chest pain non-exertional, non-radiating, and there is no nausea or diaphoresis.  Low concern for aortic dissection as chest pain non-acute onset, not radiating to back, not described as "tearing", no pulse or neuro deficit on exam.  Low concern for pulmonary embolism, patient is PERC negative with low pre-test probability of PE.  Plan for labs, cxr, supportive care.  Dispo pending.

## 2022-07-07 ENCOUNTER — NON-APPOINTMENT (OUTPATIENT)
Age: 22
End: 2022-07-07

## 2022-07-07 ENCOUNTER — APPOINTMENT (OUTPATIENT)
Dept: CARDIOLOGY | Facility: CLINIC | Age: 22
End: 2022-07-07

## 2022-07-07 VITALS
HEART RATE: 62 BPM | DIASTOLIC BLOOD PRESSURE: 69 MMHG | SYSTOLIC BLOOD PRESSURE: 115 MMHG | WEIGHT: 124 LBS | BODY MASS INDEX: 22.82 KG/M2 | HEIGHT: 62 IN | OXYGEN SATURATION: 99 %

## 2022-07-07 PROCEDURE — 93000 ELECTROCARDIOGRAM COMPLETE: CPT

## 2022-07-07 PROCEDURE — 99214 OFFICE O/P EST MOD 30 MIN: CPT | Mod: 25

## 2022-08-11 NOTE — ED PROVIDER NOTE - LANGUAGE ASSISTANCE NEEDED
Yes-Patient/Caregiver accepts free interpretation services... Qbrexza Counseling:  I discussed with the patient the risks of Qbrexza including but not limited to headache, mydriasis, blurred vision, dry eyes, nasal dryness, dry mouth, dry throat, dry skin, urinary hesitation, and constipation.  Local skin reactions including erythema, burning, stinging, and itching can also occur.

## 2023-05-04 ENCOUNTER — APPOINTMENT (OUTPATIENT)
Dept: CARDIOLOGY | Facility: CLINIC | Age: 23
End: 2023-05-04
Payer: MEDICAID

## 2023-05-04 ENCOUNTER — NON-APPOINTMENT (OUTPATIENT)
Age: 23
End: 2023-05-04

## 2023-05-04 ENCOUNTER — EMERGENCY (EMERGENCY)
Facility: HOSPITAL | Age: 23
LOS: 1 days | Discharge: HOME | End: 2023-05-04
Attending: EMERGENCY MEDICINE | Admitting: EMERGENCY MEDICINE
Payer: MEDICAID

## 2023-05-04 VITALS
SYSTOLIC BLOOD PRESSURE: 119 MMHG | RESPIRATION RATE: 17 BRPM | DIASTOLIC BLOOD PRESSURE: 73 MMHG | HEART RATE: 72 BPM | OXYGEN SATURATION: 100 %

## 2023-05-04 VITALS
OXYGEN SATURATION: 100 % | DIASTOLIC BLOOD PRESSURE: 78 MMHG | TEMPERATURE: 98 F | SYSTOLIC BLOOD PRESSURE: 127 MMHG | RESPIRATION RATE: 16 BRPM | HEART RATE: 74 BPM

## 2023-05-04 VITALS
HEIGHT: 62 IN | DIASTOLIC BLOOD PRESSURE: 72 MMHG | SYSTOLIC BLOOD PRESSURE: 133 MMHG | BODY MASS INDEX: 26.13 KG/M2 | WEIGHT: 142 LBS | OXYGEN SATURATION: 98 % | HEART RATE: 87 BPM

## 2023-05-04 LAB
ALBUMIN SERPL ELPH-MCNC: 4.9 G/DL — SIGNIFICANT CHANGE UP (ref 3.3–5)
ALP SERPL-CCNC: 85 U/L — SIGNIFICANT CHANGE UP (ref 40–120)
ALT FLD-CCNC: 18 U/L — SIGNIFICANT CHANGE UP (ref 4–33)
ANION GAP SERPL CALC-SCNC: 10 MMOL/L — SIGNIFICANT CHANGE UP (ref 7–14)
APPEARANCE UR: CLEAR — SIGNIFICANT CHANGE UP
AST SERPL-CCNC: 19 U/L — SIGNIFICANT CHANGE UP (ref 4–32)
BACTERIA # UR AUTO: ABNORMAL
BASOPHILS # BLD AUTO: 0.02 K/UL — SIGNIFICANT CHANGE UP (ref 0–0.2)
BASOPHILS NFR BLD AUTO: 0.2 % — SIGNIFICANT CHANGE UP (ref 0–2)
BILIRUB SERPL-MCNC: 0.7 MG/DL — SIGNIFICANT CHANGE UP (ref 0.2–1.2)
BILIRUB UR-MCNC: NEGATIVE — SIGNIFICANT CHANGE UP
BUN SERPL-MCNC: 10 MG/DL — SIGNIFICANT CHANGE UP (ref 7–23)
CALCIUM SERPL-MCNC: 9.6 MG/DL — SIGNIFICANT CHANGE UP (ref 8.4–10.5)
CHLORIDE SERPL-SCNC: 102 MMOL/L — SIGNIFICANT CHANGE UP (ref 98–107)
CO2 SERPL-SCNC: 25 MMOL/L — SIGNIFICANT CHANGE UP (ref 22–31)
COLOR SPEC: YELLOW — SIGNIFICANT CHANGE UP
CREAT SERPL-MCNC: 0.83 MG/DL — SIGNIFICANT CHANGE UP (ref 0.5–1.3)
DIFF PNL FLD: NEGATIVE — SIGNIFICANT CHANGE UP
EGFR: 102 ML/MIN/1.73M2 — SIGNIFICANT CHANGE UP
EOSINOPHIL # BLD AUTO: 0.03 K/UL — SIGNIFICANT CHANGE UP (ref 0–0.5)
EOSINOPHIL NFR BLD AUTO: 0.4 % — SIGNIFICANT CHANGE UP (ref 0–6)
EPI CELLS # UR: 5 /HPF — SIGNIFICANT CHANGE UP (ref 0–5)
GLUCOSE SERPL-MCNC: 87 MG/DL — SIGNIFICANT CHANGE UP (ref 70–99)
GLUCOSE UR QL: NEGATIVE — SIGNIFICANT CHANGE UP
HCT VFR BLD CALC: 41.2 % — SIGNIFICANT CHANGE UP (ref 34.5–45)
HGB BLD-MCNC: 14.5 G/DL — SIGNIFICANT CHANGE UP (ref 11.5–15.5)
HYALINE CASTS # UR AUTO: 1 /LPF — SIGNIFICANT CHANGE UP (ref 0–7)
IANC: 5.19 K/UL — SIGNIFICANT CHANGE UP (ref 1.8–7.4)
IMM GRANULOCYTES NFR BLD AUTO: 0.2 % — SIGNIFICANT CHANGE UP (ref 0–0.9)
KETONES UR-MCNC: NEGATIVE — SIGNIFICANT CHANGE UP
LEUKOCYTE ESTERASE UR-ACNC: NEGATIVE — SIGNIFICANT CHANGE UP
LIDOCAIN IGE QN: 30 U/L — SIGNIFICANT CHANGE UP (ref 7–60)
LYMPHOCYTES # BLD AUTO: 2.32 K/UL — SIGNIFICANT CHANGE UP (ref 1–3.3)
LYMPHOCYTES # BLD AUTO: 28.6 % — SIGNIFICANT CHANGE UP (ref 13–44)
MAGNESIUM SERPL-MCNC: 2 MG/DL — SIGNIFICANT CHANGE UP (ref 1.6–2.6)
MCHC RBC-ENTMCNC: 30.2 PG — SIGNIFICANT CHANGE UP (ref 27–34)
MCHC RBC-ENTMCNC: 35.2 GM/DL — SIGNIFICANT CHANGE UP (ref 32–36)
MCV RBC AUTO: 85.8 FL — SIGNIFICANT CHANGE UP (ref 80–100)
MONOCYTES # BLD AUTO: 0.54 K/UL — SIGNIFICANT CHANGE UP (ref 0–0.9)
MONOCYTES NFR BLD AUTO: 6.7 % — SIGNIFICANT CHANGE UP (ref 2–14)
NEUTROPHILS # BLD AUTO: 5.19 K/UL — SIGNIFICANT CHANGE UP (ref 1.8–7.4)
NEUTROPHILS NFR BLD AUTO: 63.9 % — SIGNIFICANT CHANGE UP (ref 43–77)
NITRITE UR-MCNC: NEGATIVE — SIGNIFICANT CHANGE UP
NRBC # BLD: 0 /100 WBCS — SIGNIFICANT CHANGE UP (ref 0–0)
NRBC # FLD: 0 K/UL — SIGNIFICANT CHANGE UP (ref 0–0)
PH UR: 7.5 — SIGNIFICANT CHANGE UP (ref 5–8)
PLATELET # BLD AUTO: 134 K/UL — LOW (ref 150–400)
POTASSIUM SERPL-MCNC: 4.3 MMOL/L — SIGNIFICANT CHANGE UP (ref 3.5–5.3)
POTASSIUM SERPL-SCNC: 4.3 MMOL/L — SIGNIFICANT CHANGE UP (ref 3.5–5.3)
PROT SERPL-MCNC: 7.6 G/DL — SIGNIFICANT CHANGE UP (ref 6–8.3)
PROT UR-MCNC: ABNORMAL
RBC # BLD: 4.8 M/UL — SIGNIFICANT CHANGE UP (ref 3.8–5.2)
RBC # FLD: 12 % — SIGNIFICANT CHANGE UP (ref 10.3–14.5)
RBC CASTS # UR COMP ASSIST: 4 /HPF — SIGNIFICANT CHANGE UP (ref 0–4)
SODIUM SERPL-SCNC: 137 MMOL/L — SIGNIFICANT CHANGE UP (ref 135–145)
SP GR SPEC: 1.02 — SIGNIFICANT CHANGE UP (ref 1.01–1.05)
UROBILINOGEN FLD QL: SIGNIFICANT CHANGE UP
WBC # BLD: 8.12 K/UL — SIGNIFICANT CHANGE UP (ref 3.8–10.5)
WBC # FLD AUTO: 8.12 K/UL — SIGNIFICANT CHANGE UP (ref 3.8–10.5)
WBC UR QL: 1 /HPF — SIGNIFICANT CHANGE UP (ref 0–5)

## 2023-05-04 PROCEDURE — 93000 ELECTROCARDIOGRAM COMPLETE: CPT

## 2023-05-04 PROCEDURE — 99284 EMERGENCY DEPT VISIT MOD MDM: CPT

## 2023-05-04 PROCEDURE — 99214 OFFICE O/P EST MOD 30 MIN: CPT | Mod: 25

## 2023-05-04 RX ORDER — FAMOTIDINE 10 MG/ML
20 INJECTION INTRAVENOUS ONCE
Refills: 0 | Status: COMPLETED | OUTPATIENT
Start: 2023-05-04 | End: 2023-05-04

## 2023-05-04 RX ORDER — LIDOCAINE 4 G/100G
10 CREAM TOPICAL ONCE
Refills: 0 | Status: COMPLETED | OUTPATIENT
Start: 2023-05-04 | End: 2023-05-04

## 2023-05-04 RX ORDER — ACETAMINOPHEN 500 MG
975 TABLET ORAL ONCE
Refills: 0 | Status: COMPLETED | OUTPATIENT
Start: 2023-05-04 | End: 2023-05-04

## 2023-05-04 RX ADMIN — Medication 975 MILLIGRAM(S): at 14:12

## 2023-05-04 RX ADMIN — FAMOTIDINE 20 MILLIGRAM(S): 10 INJECTION INTRAVENOUS at 14:12

## 2023-05-04 RX ADMIN — Medication 30 MILLILITER(S): at 14:03

## 2023-05-04 RX ADMIN — LIDOCAINE 10 MILLILITER(S): 4 CREAM TOPICAL at 14:03

## 2023-05-04 NOTE — ED PROVIDER NOTE - OBJECTIVE STATEMENT
22-year-old female pmhx of congenital deafness status postcochlear implant, TAVR not on anticoagulation comes to ED w/ left upper quadrant abdominal pain for 3 weeks.  Patient reports that symptoms have happened previously 3 years ago and resolved on its own without intervention.  Patient reports that current episode started 3 weeks ago. Their pain/symptom is moderate, constant, non mediating with rest, worsens when she eats. Started randomly. Denies falls, trauma, chest pain, shortness of breath, nausea, vomiting, diarrhea, melena, hematochezia, urinary symptoms, skin changes. 22-year-old female pmhx of congenital deafness status postcochlear implant, TAVR not on anticoagulation comes to ED w/ left upper quadrant abdominal pain for 3 weeks.  Patient reports that symptoms have happened previously 3 years ago and resolved on its own without intervention.  Patient reports that current episode started 3 weeks ago. Their pain/symptom is moderate, constant, non mediating with rest, worsens when she eats. Started randomly. Denies falls, trauma, chest pain, shortness of breath, nausea, vomiting, diarrhea, melena, hematochezia, urinary symptoms, skin changes.    [facility] () ID:[id number]

## 2023-05-04 NOTE — ED ADULT TRIAGE NOTE - CHIEF COMPLAINT QUOTE
Pt. c/o constant left sided abdominal pain x 2-3 weeks. Denies n/v/d or fevers. No pmhx.  used for sign language interpretation.

## 2023-05-04 NOTE — ED PROVIDER NOTE - PROGRESS NOTE DETAILS
**not final**[Patient´s prescription sent to their pharmacy indicated during interview]. Improvement on symptoms. [Passed PO challenge]. Spoke to patient/family about results including incidental findings. Plan to discharge patient. Patient given [PCP] follow up and return precautions. Patient/family agrees with plan. Improvement on symptoms. Passed PO challenge. Spoke to patient/family about results including incidental findings. Plan to discharge patient. Patient given gastroenterology and PCP follow up and return precautions. Patient/family agrees with plan. Raghu ([facility]) ID:[id number] Improvement on symptoms. Passed PO challenge. Spoke to patient/family about results including incidental findings. Plan to discharge patient. Patient given gastroenterology and PCP follow up and return precautions. Patient/family agrees with plan. Raghu () ID:[id number] Improvement on symptoms. Passed PO challenge. Spoke to patient/family about results including incidental findings. Plan to discharge patient. Patient given gastroenterology and PCP follow up and return precautions. Patient/family agrees with plan.

## 2023-05-04 NOTE — ED PROVIDER NOTE - PATIENT PORTAL LINK FT
You can access the FollowMyHealth Patient Portal offered by [facility] by registering at the following website: [email or web address]. By joining [facility]’s FollowMyHealth portal, you will also be able to view your health information using other applications (apps) compatible with our system.

## 2023-05-04 NOTE — ED PROVIDER NOTE - ATTENDING CONTRIBUTION TO CARE
Dr. Goetz:  I have personally performed a face to face bedside history and physical examination of this patient. I have discussed the history, examination, review of systems, assessment and plan of management with the resident. I have reviewed the electronic medical record and amended it to reflect my history, review of systems, physical exam, assessment and plan.    22F c/o 3wks LLQ pain. Pain is constant, denies associated sx.  Denies fever/chills, cp, sob, n/v/d/c, vaginal discharge. Had similar pain years ago that self-resolved.      Exam:  - nad  - rrr  - ctab   -abd soft, NTND  - pelvic exam as per resident    A/P  - LLQ pain, eval ovarian pathology  - cbc ,cmp, ua, ucg, TVUS Dr. Goetz:  I have personally performed a face to face bedside history and physical examination of this patient. I have discussed the history, examination, review of systems, assessment and plan of management with the resident. I have reviewed the electronic medical record and amended it to reflect my history, review of systems, physical exam, assessment and plan.    22F c/o 3wks left sided abd pain. Pain is constant, worse after eating, denies associated sx.  Denies fever/chills, cp, sob, n/v/d/c, vaginal discharge.  Never been sexually active.  Had similar pain years ago that self-resolved.      Exam:  - nad  - rrr  - ctab   -abd soft, NTND  - pelvic exam (bimanual only with patient's consent) with no adnexal TTP    A/P  - L sided abd pain, suspect gastritis/H pylori, r/o pancreatitis, UTI  - cbc ,cmp, lipase, ua

## 2023-05-04 NOTE — ED ADULT NURSE NOTE - OBJECTIVE STATEMENT
Receive pt. in Intake room 4 alert and oriented x 4, presenting to the ER with complaints of left upper abdominal pain. Pt. is speech impaired,  Carter, ID number: [id number] provided American sign language interpretation. Pt. stated " I have pain in my upper left abdomen for 3 weeks my mom gave me some medications at home but it did not help much". No c/o nausea or vomiting, medicated as ordered, labs sent. Call bell place within reach.

## 2023-05-04 NOTE — ED PROVIDER NOTE - NSFOLLOWUPCLINICSTOKEN_GEN_ALL_ED_FT
[id number]: || ||00\01||False;[id number]: || ||00\01||False;[id number]: || ||00\01||False;[id number]: || ||00\01||False;[id number]: || ||00\01||False;

## 2023-05-04 NOTE — ED PROVIDER NOTE - CLINICAL SUMMARY MEDICAL DECISION MAKING FREE TEXT BOX
Impression: 22-year-old female pmhx of congenital deafness status postcochlear implant, TAVR not on anticoagulation comes to ED w/ left upper quadrant abdominal pain for 3 weeks. Their symptoms and exam findings are concerning for gastritis, peptic ulcer.    Ordered labs, medications for diagnosis, management, and treatment.

## 2023-05-04 NOTE — ED PROVIDER NOTE - NSFOLLOWUPCLINICS_GEN_ALL_ED_FT
Gastroenterology at [facility]  Gastroenterology  [address] [place] [address]  Phone: [phone]  Fax:     Gastroenterology at [place]  Gastroenterology  [address] [place] [address]  Phone: [phone]  Fax: [phone]    [facility] at [place]  Gastroenterology  [address] [place] [address]  Phone: [phone]  Fax:     [facility]  Gastroenterology  [address] [address]  Phone: [phone]  Fax:     [facility]  Gastroenterology  [address] [place] [address]  Phone: [phone]  Fax: [phone]

## 2023-05-04 NOTE — ED PROVIDER NOTE - NSFOLLOWUPINSTRUCTIONS_ED_ALL_ED_FT
You were seen in the Emergency Department for Abdominal pain.     1) Advance activity as tolerated.   2) Continue all previously prescribed medications as directed.    3) Follow up with your primary care physician in 1 week - take copies of your results.    4) Return to the Emergency Department for worsening or persistent symptoms, and/or ANY NEW OR CONCERNING SYMPTOMS.      Abdominal Pain    Many things can cause abdominal pain. Many times, abdominal pain is not caused by a disease and will improve without treatment. Your health care provider will do a physical exam to determine if there is a dangerous cause of your pain; blood tests and imaging may help determine the cause of your pain. However, in many cases, no cause may be found and you may need further testing as an outpatient. Monitor your abdominal pain for any changes.     SEEK IMMEDIATE MEDICAL CARE IF YOU HAVE ANY OF THE FOLLOWING SYMPTOMS: worsening abdominal pain, uncontrollable vomiting, profuse diarrhea, inability to have bowel movements or pass gas, black or bloody stools, fever accompanying chest pain or back pain, or fainting. These symptoms may represent a serious problem that is an emergency. Do not wait to see if the symptoms will go away. Get medical help right away. Call 911 and do not drive yourself to the hospital.

## 2023-05-06 LAB
CULTURE RESULTS: SIGNIFICANT CHANGE UP
SPECIMEN SOURCE: SIGNIFICANT CHANGE UP

## 2023-06-19 ENCOUNTER — APPOINTMENT (OUTPATIENT)
Dept: ULTRASOUND IMAGING | Facility: CLINIC | Age: 23
End: 2023-06-19
Payer: MEDICAID

## 2023-06-19 ENCOUNTER — OUTPATIENT (OUTPATIENT)
Dept: OUTPATIENT SERVICES | Facility: HOSPITAL | Age: 23
LOS: 1 days | End: 2023-06-19
Payer: MEDICAID

## 2023-06-19 DIAGNOSIS — R10.9 UNSPECIFIED ABDOMINAL PAIN: ICD-10-CM

## 2023-06-19 PROCEDURE — 76700 US EXAM ABDOM COMPLETE: CPT | Mod: 26

## 2023-06-19 PROCEDURE — 76700 US EXAM ABDOM COMPLETE: CPT

## 2023-09-12 NOTE — ED PROVIDER NOTE - PRINCIPAL DIAGNOSIS
[facility] MEDICINE PROGRESS NOTE   Patient: Patrick W Lombardi  Today's Date: 9/12/2023    YOB: 1952  Admission Date: 9/7/2023    MRN: [id number]  Inpatient LOS: 5    Room: [facility]/01/2023), AFib and DVT (2013) on Eliquis and beta-blocker, Attending Radiologist Azael Schroeder, have reviewed the images and report  and concur with these findings interpreted by Resident Radiologist, Hardy Gamboa MD.    Electronically Signed by: Azael Schroeder   Teresa Gill, RAYSHAWN, ACNP  [facility]  Please contact via secure chat [phone]           Abdominal pain

## 2023-10-11 ENCOUNTER — APPOINTMENT (OUTPATIENT)
Dept: ORTHOPEDIC SURGERY | Facility: CLINIC | Age: 23
End: 2023-10-11
Payer: MEDICAID

## 2023-10-11 VITALS — WEIGHT: 130 LBS | HEIGHT: 61 IN | BODY MASS INDEX: 24.55 KG/M2

## 2023-10-11 DIAGNOSIS — M75.52 BURSITIS OF LEFT SHOULDER: ICD-10-CM

## 2023-10-11 PROCEDURE — 73030 X-RAY EXAM OF SHOULDER: CPT | Mod: LT

## 2023-10-11 PROCEDURE — 20610 DRAIN/INJ JOINT/BURSA W/O US: CPT | Mod: LT

## 2023-10-11 PROCEDURE — J3490M: CUSTOM

## 2023-10-11 PROCEDURE — 73010 X-RAY EXAM OF SHOULDER BLADE: CPT | Mod: LT

## 2023-10-11 PROCEDURE — 99204 OFFICE O/P NEW MOD 45 MIN: CPT | Mod: 25

## 2023-10-11 PROCEDURE — 72040 X-RAY EXAM NECK SPINE 2-3 VW: CPT

## 2023-10-11 RX ORDER — ASPIRIN 81 MG/1
81 TABLET, DELAYED RELEASE ORAL
Refills: 0 | Status: ACTIVE | COMMUNITY

## 2023-10-18 ENCOUNTER — OUTPATIENT (OUTPATIENT)
Dept: OUTPATIENT SERVICES | Facility: HOSPITAL | Age: 23
LOS: 1 days | End: 2023-10-18
Payer: MEDICAID

## 2023-10-18 ENCOUNTER — APPOINTMENT (OUTPATIENT)
Dept: CV DIAGNOSITCS | Facility: HOSPITAL | Age: 23
End: 2023-10-18

## 2023-10-18 DIAGNOSIS — I07.1 RHEUMATIC TRICUSPID INSUFFICIENCY: ICD-10-CM

## 2023-10-18 DIAGNOSIS — I35.1 NONRHEUMATIC AORTIC (VALVE) INSUFFICIENCY: ICD-10-CM

## 2023-10-18 DIAGNOSIS — Z95.2 PRESENCE OF PROSTHETIC HEART VALVE: ICD-10-CM

## 2023-10-18 DIAGNOSIS — I34.0 NONRHEUMATIC MITRAL (VALVE) INSUFFICIENCY: ICD-10-CM

## 2023-10-18 PROCEDURE — 93306 TTE W/DOPPLER COMPLETE: CPT | Mod: 26

## 2023-10-31 ENCOUNTER — EMERGENCY (EMERGENCY)
Facility: HOSPITAL | Age: 23
LOS: 1 days | Discharge: HOME | End: 2023-10-31
Attending: EMERGENCY MEDICINE | Admitting: EMERGENCY MEDICINE
Payer: MEDICAID

## 2023-10-31 VITALS
SYSTOLIC BLOOD PRESSURE: 98 MMHG | TEMPERATURE: 99 F | DIASTOLIC BLOOD PRESSURE: 58 MMHG | HEART RATE: 79 BPM | RESPIRATION RATE: 18 BRPM | OXYGEN SATURATION: 100 %

## 2023-10-31 VITALS
TEMPERATURE: 99 F | OXYGEN SATURATION: 100 % | HEART RATE: 80 BPM | SYSTOLIC BLOOD PRESSURE: 126 MMHG | RESPIRATION RATE: 16 BRPM | DIASTOLIC BLOOD PRESSURE: 69 MMHG

## 2023-10-31 LAB
ALBUMIN SERPL ELPH-MCNC: 5.2 G/DL — HIGH (ref 3.3–5)
ALBUMIN SERPL ELPH-MCNC: 5.2 G/DL — HIGH (ref 3.3–5)
ALP SERPL-CCNC: 85 U/L — SIGNIFICANT CHANGE UP (ref 40–120)
ALP SERPL-CCNC: 85 U/L — SIGNIFICANT CHANGE UP (ref 40–120)
ALT FLD-CCNC: 10 U/L — SIGNIFICANT CHANGE UP (ref 4–33)
ALT FLD-CCNC: 10 U/L — SIGNIFICANT CHANGE UP (ref 4–33)
ANION GAP SERPL CALC-SCNC: 14 MMOL/L — SIGNIFICANT CHANGE UP (ref 7–14)
ANION GAP SERPL CALC-SCNC: 14 MMOL/L — SIGNIFICANT CHANGE UP (ref 7–14)
APPEARANCE UR: CLEAR — SIGNIFICANT CHANGE UP
APPEARANCE UR: CLEAR — SIGNIFICANT CHANGE UP
AST SERPL-CCNC: 17 U/L — SIGNIFICANT CHANGE UP (ref 4–32)
AST SERPL-CCNC: 17 U/L — SIGNIFICANT CHANGE UP (ref 4–32)
BACTERIA # UR AUTO: ABNORMAL /HPF
BACTERIA # UR AUTO: ABNORMAL /HPF
BASOPHILS # BLD AUTO: 0.02 K/UL — SIGNIFICANT CHANGE UP (ref 0–0.2)
BASOPHILS # BLD AUTO: 0.02 K/UL — SIGNIFICANT CHANGE UP (ref 0–0.2)
BASOPHILS NFR BLD AUTO: 0.2 % — SIGNIFICANT CHANGE UP (ref 0–2)
BASOPHILS NFR BLD AUTO: 0.2 % — SIGNIFICANT CHANGE UP (ref 0–2)
BILIRUB SERPL-MCNC: 1.1 MG/DL — SIGNIFICANT CHANGE UP (ref 0.2–1.2)
BILIRUB SERPL-MCNC: 1.1 MG/DL — SIGNIFICANT CHANGE UP (ref 0.2–1.2)
BILIRUB UR-MCNC: NEGATIVE — SIGNIFICANT CHANGE UP
BILIRUB UR-MCNC: NEGATIVE — SIGNIFICANT CHANGE UP
BUN SERPL-MCNC: 8 MG/DL — SIGNIFICANT CHANGE UP (ref 7–23)
BUN SERPL-MCNC: 8 MG/DL — SIGNIFICANT CHANGE UP (ref 7–23)
CALCIUM SERPL-MCNC: 9.7 MG/DL — SIGNIFICANT CHANGE UP (ref 8.4–10.5)
CALCIUM SERPL-MCNC: 9.7 MG/DL — SIGNIFICANT CHANGE UP (ref 8.4–10.5)
CAST: 0 /LPF — SIGNIFICANT CHANGE UP (ref 0–4)
CAST: 0 /LPF — SIGNIFICANT CHANGE UP (ref 0–4)
CHLORIDE SERPL-SCNC: 103 MMOL/L — SIGNIFICANT CHANGE UP (ref 98–107)
CHLORIDE SERPL-SCNC: 103 MMOL/L — SIGNIFICANT CHANGE UP (ref 98–107)
CO2 SERPL-SCNC: 23 MMOL/L — SIGNIFICANT CHANGE UP (ref 22–31)
CO2 SERPL-SCNC: 23 MMOL/L — SIGNIFICANT CHANGE UP (ref 22–31)
COLOR SPEC: YELLOW — SIGNIFICANT CHANGE UP
COLOR SPEC: YELLOW — SIGNIFICANT CHANGE UP
CREAT SERPL-MCNC: 0.85 MG/DL — SIGNIFICANT CHANGE UP (ref 0.5–1.3)
CREAT SERPL-MCNC: 0.85 MG/DL — SIGNIFICANT CHANGE UP (ref 0.5–1.3)
DIFF PNL FLD: NEGATIVE — SIGNIFICANT CHANGE UP
DIFF PNL FLD: NEGATIVE — SIGNIFICANT CHANGE UP
EGFR: 98 ML/MIN/1.73M2 — SIGNIFICANT CHANGE UP
EGFR: 98 ML/MIN/1.73M2 — SIGNIFICANT CHANGE UP
EOSINOPHIL # BLD AUTO: 0.02 K/UL — SIGNIFICANT CHANGE UP (ref 0–0.5)
EOSINOPHIL # BLD AUTO: 0.02 K/UL — SIGNIFICANT CHANGE UP (ref 0–0.5)
EOSINOPHIL NFR BLD AUTO: 0.2 % — SIGNIFICANT CHANGE UP (ref 0–6)
EOSINOPHIL NFR BLD AUTO: 0.2 % — SIGNIFICANT CHANGE UP (ref 0–6)
GLUCOSE SERPL-MCNC: 79 MG/DL — SIGNIFICANT CHANGE UP (ref 70–99)
GLUCOSE SERPL-MCNC: 79 MG/DL — SIGNIFICANT CHANGE UP (ref 70–99)
GLUCOSE UR QL: NEGATIVE MG/DL — SIGNIFICANT CHANGE UP
GLUCOSE UR QL: NEGATIVE MG/DL — SIGNIFICANT CHANGE UP
HCG SERPL-ACNC: <1 MIU/ML — SIGNIFICANT CHANGE UP
HCG SERPL-ACNC: <1 MIU/ML — SIGNIFICANT CHANGE UP
HCT VFR BLD CALC: 44.2 % — SIGNIFICANT CHANGE UP (ref 34.5–45)
HCT VFR BLD CALC: 44.2 % — SIGNIFICANT CHANGE UP (ref 34.5–45)
HGB BLD-MCNC: 15.7 G/DL — HIGH (ref 11.5–15.5)
HGB BLD-MCNC: 15.7 G/DL — HIGH (ref 11.5–15.5)
IANC: 5.91 K/UL — SIGNIFICANT CHANGE UP (ref 1.8–7.4)
IANC: 5.91 K/UL — SIGNIFICANT CHANGE UP (ref 1.8–7.4)
IMM GRANULOCYTES NFR BLD AUTO: 0.4 % — SIGNIFICANT CHANGE UP (ref 0–0.9)
IMM GRANULOCYTES NFR BLD AUTO: 0.4 % — SIGNIFICANT CHANGE UP (ref 0–0.9)
KETONES UR-MCNC: ABNORMAL MG/DL
KETONES UR-MCNC: ABNORMAL MG/DL
LEUKOCYTE ESTERASE UR-ACNC: NEGATIVE — SIGNIFICANT CHANGE UP
LEUKOCYTE ESTERASE UR-ACNC: NEGATIVE — SIGNIFICANT CHANGE UP
LIDOCAIN IGE QN: 97 U/L — HIGH (ref 7–60)
LIDOCAIN IGE QN: 97 U/L — HIGH (ref 7–60)
LYMPHOCYTES # BLD AUTO: 1.89 K/UL — SIGNIFICANT CHANGE UP (ref 1–3.3)
LYMPHOCYTES # BLD AUTO: 1.89 K/UL — SIGNIFICANT CHANGE UP (ref 1–3.3)
LYMPHOCYTES # BLD AUTO: 22.8 % — SIGNIFICANT CHANGE UP (ref 13–44)
LYMPHOCYTES # BLD AUTO: 22.8 % — SIGNIFICANT CHANGE UP (ref 13–44)
MCHC RBC-ENTMCNC: 31.1 PG — SIGNIFICANT CHANGE UP (ref 27–34)
MCHC RBC-ENTMCNC: 31.1 PG — SIGNIFICANT CHANGE UP (ref 27–34)
MCHC RBC-ENTMCNC: 35.5 GM/DL — SIGNIFICANT CHANGE UP (ref 32–36)
MCHC RBC-ENTMCNC: 35.5 GM/DL — SIGNIFICANT CHANGE UP (ref 32–36)
MCV RBC AUTO: 87.5 FL — SIGNIFICANT CHANGE UP (ref 80–100)
MCV RBC AUTO: 87.5 FL — SIGNIFICANT CHANGE UP (ref 80–100)
MONOCYTES # BLD AUTO: 0.43 K/UL — SIGNIFICANT CHANGE UP (ref 0–0.9)
MONOCYTES # BLD AUTO: 0.43 K/UL — SIGNIFICANT CHANGE UP (ref 0–0.9)
MONOCYTES NFR BLD AUTO: 5.2 % — SIGNIFICANT CHANGE UP (ref 2–14)
MONOCYTES NFR BLD AUTO: 5.2 % — SIGNIFICANT CHANGE UP (ref 2–14)
NEUTROPHILS # BLD AUTO: 5.91 K/UL — SIGNIFICANT CHANGE UP (ref 1.8–7.4)
NEUTROPHILS # BLD AUTO: 5.91 K/UL — SIGNIFICANT CHANGE UP (ref 1.8–7.4)
NEUTROPHILS NFR BLD AUTO: 71.2 % — SIGNIFICANT CHANGE UP (ref 43–77)
NEUTROPHILS NFR BLD AUTO: 71.2 % — SIGNIFICANT CHANGE UP (ref 43–77)
NITRITE UR-MCNC: NEGATIVE — SIGNIFICANT CHANGE UP
NITRITE UR-MCNC: NEGATIVE — SIGNIFICANT CHANGE UP
NRBC # BLD: 0 /100 WBCS — SIGNIFICANT CHANGE UP (ref 0–0)
NRBC # BLD: 0 /100 WBCS — SIGNIFICANT CHANGE UP (ref 0–0)
NRBC # FLD: 0 K/UL — SIGNIFICANT CHANGE UP (ref 0–0)
NRBC # FLD: 0 K/UL — SIGNIFICANT CHANGE UP (ref 0–0)
PH UR: 6.5 — SIGNIFICANT CHANGE UP (ref 5–8)
PH UR: 6.5 — SIGNIFICANT CHANGE UP (ref 5–8)
PLATELET # BLD AUTO: 125 K/UL — LOW (ref 150–400)
PLATELET # BLD AUTO: 125 K/UL — LOW (ref 150–400)
POTASSIUM SERPL-MCNC: 3.9 MMOL/L — SIGNIFICANT CHANGE UP (ref 3.5–5.3)
POTASSIUM SERPL-MCNC: 3.9 MMOL/L — SIGNIFICANT CHANGE UP (ref 3.5–5.3)
POTASSIUM SERPL-SCNC: 3.9 MMOL/L — SIGNIFICANT CHANGE UP (ref 3.5–5.3)
POTASSIUM SERPL-SCNC: 3.9 MMOL/L — SIGNIFICANT CHANGE UP (ref 3.5–5.3)
PROT SERPL-MCNC: 8.1 G/DL — SIGNIFICANT CHANGE UP (ref 6–8.3)
PROT SERPL-MCNC: 8.1 G/DL — SIGNIFICANT CHANGE UP (ref 6–8.3)
PROT UR-MCNC: NEGATIVE MG/DL — SIGNIFICANT CHANGE UP
PROT UR-MCNC: NEGATIVE MG/DL — SIGNIFICANT CHANGE UP
RBC # BLD: 5.05 M/UL — SIGNIFICANT CHANGE UP (ref 3.8–5.2)
RBC # BLD: 5.05 M/UL — SIGNIFICANT CHANGE UP (ref 3.8–5.2)
RBC # FLD: 12 % — SIGNIFICANT CHANGE UP (ref 10.3–14.5)
RBC # FLD: 12 % — SIGNIFICANT CHANGE UP (ref 10.3–14.5)
RBC CASTS # UR COMP ASSIST: 4 /HPF — SIGNIFICANT CHANGE UP (ref 0–4)
RBC CASTS # UR COMP ASSIST: 4 /HPF — SIGNIFICANT CHANGE UP (ref 0–4)
REVIEW: SIGNIFICANT CHANGE UP
REVIEW: SIGNIFICANT CHANGE UP
SODIUM SERPL-SCNC: 140 MMOL/L — SIGNIFICANT CHANGE UP (ref 135–145)
SODIUM SERPL-SCNC: 140 MMOL/L — SIGNIFICANT CHANGE UP (ref 135–145)
SP GR SPEC: 1.02 — SIGNIFICANT CHANGE UP (ref 1–1.03)
SP GR SPEC: 1.02 — SIGNIFICANT CHANGE UP (ref 1–1.03)
SQUAMOUS # UR AUTO: 5 /HPF — SIGNIFICANT CHANGE UP (ref 0–5)
SQUAMOUS # UR AUTO: 5 /HPF — SIGNIFICANT CHANGE UP (ref 0–5)
TROPONIN T, HIGH SENSITIVITY RESULT: 6 NG/L — SIGNIFICANT CHANGE UP
TROPONIN T, HIGH SENSITIVITY RESULT: 6 NG/L — SIGNIFICANT CHANGE UP
UROBILINOGEN FLD QL: 0.2 MG/DL — SIGNIFICANT CHANGE UP (ref 0.2–1)
UROBILINOGEN FLD QL: 0.2 MG/DL — SIGNIFICANT CHANGE UP (ref 0.2–1)
WBC # BLD: 8.3 K/UL — SIGNIFICANT CHANGE UP (ref 3.8–10.5)
WBC # BLD: 8.3 K/UL — SIGNIFICANT CHANGE UP (ref 3.8–10.5)
WBC # FLD AUTO: 8.3 K/UL — SIGNIFICANT CHANGE UP (ref 3.8–10.5)
WBC # FLD AUTO: 8.3 K/UL — SIGNIFICANT CHANGE UP (ref 3.8–10.5)
WBC UR QL: 1 /HPF — SIGNIFICANT CHANGE UP (ref 0–5)
WBC UR QL: 1 /HPF — SIGNIFICANT CHANGE UP (ref 0–5)

## 2023-10-31 PROCEDURE — 76705 ECHO EXAM OF ABDOMEN: CPT | Mod: 26

## 2023-10-31 PROCEDURE — 99285 EMERGENCY DEPT VISIT HI MDM: CPT

## 2023-10-31 PROCEDURE — 93010 ELECTROCARDIOGRAM REPORT: CPT

## 2023-10-31 PROCEDURE — 71045 X-RAY EXAM CHEST 1 VIEW: CPT | Mod: 26

## 2023-10-31 RX ORDER — FAMOTIDINE 10 MG/ML
20 INJECTION INTRAVENOUS DAILY
Refills: 0 | Status: DISCONTINUED | OUTPATIENT
Start: 2023-10-31 | End: 2023-10-31

## 2023-10-31 RX ORDER — FAMOTIDINE 10 MG/ML
20 INJECTION INTRAVENOUS DAILY
Refills: 0 | Status: DISCONTINUED | OUTPATIENT
Start: 2023-10-31 | End: 2023-11-03

## 2023-10-31 RX ADMIN — FAMOTIDINE 20 MILLIGRAM(S): 10 INJECTION INTRAVENOUS at 16:22

## 2023-10-31 RX ADMIN — Medication 30 MILLILITER(S): at 15:03

## 2023-10-31 NOTE — ED ADULT TRIAGE NOTE - CHIEF COMPLAINT QUOTE
Pt c/o chest pain , abd pain, palpitations chills,  and diarrhea x 3 days.  Denies dizziness, N/V.  Pt seen by cardiology, but awaiting results.  Pt is deaf.  Sign Language inter. provided.  Pt took asp today

## 2023-10-31 NOTE — ED ADULT NURSE NOTE - OBJECTIVE STATEMENT
Patient received to intake room 9 A&Ox4, ambulatory, deaf, uses ASL for translation,  used (Eli [id number]) coming to the ED for complaints of midsternal chest pain , abdominal pain, palpitations chills, and diarrhea x 3 days. Patient denies dizziness, N/V, SOB. RR equal and unlabored. Attempted to place IV access, no success awaiting second RN attempt. Care plan continued. Comfort measures provided. Safety maintained.

## 2023-10-31 NOTE — ED PROVIDER NOTE - ATTENDING CONTRIBUTION TO CARE
Lina: I have seen and examined the patient face to face, have reviewed and addended the HPI, PE and a/p as necessary.    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  Pt reports associated shortness of breath.  Reports pain is constant.  Noted to have recent US showing gallstones.  Reports slight headache, rated 5-6/10, with no nausea, no vomiting, no blurry vision, no headache.  Denies fever, chills, nausea, vomiting, chest pain, urinary symptoms, dark stools, diarrhea.  Took Tylenol with little to no relief.  No recent travels or sick contacts.    GEN - NAD; well appearing; A+O x3; non-toxic appearing  CARD -s1s2, RRR, no M,G,R;   PULM - CTA b/l, symmetric breath sounds;   ABD -  +BS, ND, NT, soft, no guarding, no rebound, no masses;   BACK - no CVA tenderness, Normal  spine;   EXT - symmetric pulses, 2+ dp, capillary refill < 2 seconds, no cyanosis, no edema;   NEURO: alert, CN 2-12 intact, reflexes nl, sensation nl, coordination nl, romberg negative, motor 5/5 RUE/LUE/RLE/LLE/EHL/Plantar flexion, no pronator drift, gait nl     Likely gastritis, concern for possible atypical pain associated with gallstones given their previous gallstones.  Check cbc, cmp, troponin given sob and congential heart defect and RUQ US and reassess.

## 2023-10-31 NOTE — ED PROVIDER NOTE - CLINICAL SUMMARY MEDICAL DECISION MAKING FREE TEXT BOX
23-year-old female past medical history of congenital deafness, TAVR on daily aspirin presents today with left upper quadrant pain since yesterday with associated SOB 2/2 pain.  Describes LUQ as a constant discomfort like pain that is nonradiating.  Denies fever, chills, nausea, vomiting, chest pain, urinary symptoms, dark stools, diarrhea.  Took Tylenol with little to no relief.  No recent travels or sick contacts    Given history of left upper quadrant pain since yesterday that is reproducible on PE will order CBC, CMP, lipase to rule out likely gastritis versus pancreatitis.  Ordered EKG, troponin given history of TAVR to rule out ACS which is less likely given unchanged EKG without chest pain or ACS symptoms.  We will do CXR to rule out PNA given episodes of pleuritic SOB but less likely  given no URI symptoms or lower extremity edema/calf tenderness.  Given GI cocktail, will reassess

## 2023-10-31 NOTE — ED PROVIDER NOTE - PHYSICAL EXAMINATION
Gen: NAD, non-toxic appearing  Head: normal appearing  HEENT: normal conjunctiva, oral mucosa moist  Lung: no respiratory distress, speaking in full sentences, CTA b/l     CV: regular rate and rhythm, no murmurs  Abd: soft, non distended, LUQ tenderness, no rebounding or guarding   MSK: no visible deformities, no calf tenderness or chest pain.   Neuro: No focal deficits, AAOx3  Skin: Warm  Psych: normal affect

## 2023-10-31 NOTE — ED PROCEDURE NOTE - ATTENDING CONTRIBUTION TO CARE
Lina: I have seen and examined the patient face to face.  I was present during the key portions of the above procedure and  have reviewed and addended the procedure note.

## 2023-10-31 NOTE — ED PROVIDER NOTE - NSFOLLOWUPINSTRUCTIONS_ED_ALL_ED_FT
Please schedule follow up appointment with PCP for further evaluation of symptoms.    Continue a healthy diet that included increased hydration and fluid intake.    Please take Tylenol up to 650 mg every 6 hours as needed for pain and/or Motrin up to 600 mg every 8 hours as needed for pain    Please take any prescribed medications as instructed by your PMD    Abdominal Pain    Many things can cause abdominal pain. Many times, abdominal pain is not caused by a disease and will improve without treatment. Your health care provider will do a physical exam to determine if there is a dangerous cause of your pain; blood tests and imaging may help determine the cause of your pain. However, in many cases, no cause may be found and you may need further testing as an outpatient. Monitor your abdominal pain for any changes.     SEEK IMMEDIATE MEDICAL CARE IF YOU HAVE ANY OF THE FOLLOWING SYMPTOMS: worsening abdominal pain, uncontrollable vomiting, profuse diarrhea, inability to have bowel movements or pass gas, black or bloody stools, fever accompanying chest pain or back pain, or fainting. These symptoms may represent a serious problem that is an emergency. Do not wait to see if the symptoms will go away. Get medical help right away. Call 911 and do not drive yourself to the hospital.

## 2023-10-31 NOTE — ED ADULT NURSE REASSESSMENT NOTE - NS ED NURSE REASSESS COMMENT FT1
US guided IV placed at this time by MD. Labs drawn and sent. Medicated as ordered. Safety maintained.

## 2023-10-31 NOTE — ED ADULT NURSE NOTE - NSFALLUNIVINTERV_ED_ALL_ED
Bed/Stretcher in lowest position, wheels locked, appropriate side rails in place/Call bell, personal items and telephone in reach/Instruct patient to call for assistance before getting out of bed/chair/stretcher/Non-slip footwear applied when patient is off stretcher/[place] to call system/Physically safe environment - no spills, clutter or unnecessary equipment/Purposeful proactive rounding/Room/bathroom lighting operational, light cord in reach

## 2023-10-31 NOTE — ED PROVIDER NOTE - OBJECTIVE STATEMENT
23-year-old female past medical history of congenital deafness, TAVR on daily aspirin presents today with left upper quadrant pain since yesterday with associated SOB 2/2 pain.  Describes LUQ as a constant discomfort like pain that is nonradiating.  Denies fever, chills, nausea, vomiting, chest pain, urinary symptoms, dark stools, diarrhea.  Took Tylenol with little to no relief.  No recent travels or sick contacts.    Used Language Ipad to communicate.

## 2023-10-31 NOTE — ED ADULT NURSE REASSESSMENT NOTE - NS ED NURSE REASSESS COMMENT FT1
Report received from QUAN Lee. Pt resting in stretcher at this time, offers no complaints. Respirations equal and unlabored. Pending lab results. No acute distress noted. Safety maintained.

## 2023-10-31 NOTE — ED PROCEDURE NOTE - PROCEDURE ADDITIONAL DETAILS
Emergency Department Focused Ultrasound performed at patient's bedside for placement of ultrasound guided IV. The study was confirmed with blood return and ease of flushing saline. 20 g in right AC

## 2023-10-31 NOTE — ED PROVIDER NOTE - PATIENT PORTAL LINK FT
You can access the FollowMyHealth Patient Portal offered by [facility] by registering at the following website: [email or web address]. By joining [employer]’s FollowMyHealth portal, you will also be able to view your health information using other applications (apps) compatible with our system.

## 2023-11-22 ENCOUNTER — APPOINTMENT (OUTPATIENT)
Dept: ORTHOPEDIC SURGERY | Facility: CLINIC | Age: 23
End: 2023-11-22

## 2023-11-22 ENCOUNTER — APPOINTMENT (OUTPATIENT)
Dept: ORTHOPEDIC SURGERY | Facility: CLINIC | Age: 23
End: 2023-11-22
Payer: MEDICAID

## 2023-11-22 VITALS — BODY MASS INDEX: 24.55 KG/M2 | HEIGHT: 61 IN | WEIGHT: 130 LBS

## 2023-11-22 DIAGNOSIS — M75.02 ADHESIVE CAPSULITIS OF LEFT SHOULDER: ICD-10-CM

## 2023-11-22 DIAGNOSIS — M75.42 IMPINGEMENT SYNDROME OF LEFT SHOULDER: ICD-10-CM

## 2023-11-22 PROCEDURE — 99213 OFFICE O/P EST LOW 20 MIN: CPT

## 2024-03-05 ENCOUNTER — EMERGENCY (EMERGENCY)
Facility: HOSPITAL | Age: 24
LOS: 1 days | Discharge: HOME | End: 2024-03-05
Admitting: EMERGENCY MEDICINE
Payer: MEDICAID

## 2024-03-05 VITALS
TEMPERATURE: 98 F | RESPIRATION RATE: 17 BRPM | DIASTOLIC BLOOD PRESSURE: 74 MMHG | HEART RATE: 71 BPM | OXYGEN SATURATION: 100 % | SYSTOLIC BLOOD PRESSURE: 119 MMHG

## 2024-03-05 VITALS
DIASTOLIC BLOOD PRESSURE: 71 MMHG | HEART RATE: 78 BPM | RESPIRATION RATE: 17 BRPM | OXYGEN SATURATION: 100 % | TEMPERATURE: 98 F | SYSTOLIC BLOOD PRESSURE: 124 MMHG

## 2024-03-05 PROCEDURE — 99284 EMERGENCY DEPT VISIT MOD MDM: CPT

## 2024-03-05 RX ORDER — CEPHALEXIN 500 MG
500 CAPSULE ORAL ONCE
Refills: 0 | Status: DISCONTINUED | OUTPATIENT
Start: 2024-03-05 | End: 2024-03-06

## 2024-03-05 NOTE — ED PROVIDER NOTE - PROGRESS NOTE DETAILS
GUMARO Sweet: discussed with davidson TORRES pt GUMARO Sweet: pt now s/p bedside I&D with GYN, culture obtained  Pt to be discharged with Augmentin and follow up with OBGYN clinic

## 2024-03-05 NOTE — ED PROVIDER NOTE - OBJECTIVE STATEMENT
Patient is a 23-year-old female with past medical history of deafness, congenital heart disease status post surgery who presented to ED with vaginal abscess.  Patient reports she has had the abscess on and off for the past approximate 6 years.  Patient reports she has had multiple ER visits and required drainage in the past.  Patient reports she does not have an OB/GYN to follow-up with at this time.  Patient reports over the past 3 days the cyst has gotten bigger and more painful.  Patient denies any fevers, chills, nausea, vomiting, vaginal discharge, dysuria.     used ID# [id number]

## 2024-03-05 NOTE — ED PROVIDER NOTE - PATIENT PORTAL LINK FT
You can access the FollowMyHealth Patient Portal offered by [facility] by registering at the following website: [email or web address]. By joining [employer]’s FollowMyHealth portal, you will also be able to view your health information using other applications (apps) compatible with our system.

## 2024-03-05 NOTE — ED PROVIDER NOTE - CLINICAL SUMMARY MEDICAL DECISION MAKING FREE TEXT BOX
Patient is a 23-year-old female with past medical history of deafness, congenital heart disease status post surgery who presented to ED with vaginal abscess. Pt reports having the abscess intermittently since 2018. Reports pain and swelling started again 3 days ago. Pt afebrile. Pt with fluctuant abscess to right labia majora. Plan for GYN consult and will reassess.

## 2024-03-05 NOTE — ED ADULT TRIAGE NOTE - CHIEF COMPLAINT QUOTE
patient c/o vaginal cyst x3 days. per patient, she has been seen here before for it. patient endorsing pain to area. past medical history of congenital heart issue

## 2024-03-05 NOTE — ED ADULT NURSE NOTE - OBJECTIVE STATEMENT
Pt received to intake room 1 a&ox4, ambulatory, on room air coming to ED c/o abscess. Pt history of congential heart disease. Pt arrives to ER c/o vaginal abscess, visualized with GUMARO Chaves. Pt has had abscess x6 years, has required drainage in past. Pt respirations even and unlabored, chest rise and fall equal b/l. Pt denies chest pain, HA, SOB, dizziness, N/V/D, fever/chills. Abdomen soft, round, nondistended. Pt pending dispo. Stretcher in lowest position, call bell within reach, pt safety maintained.

## 2024-03-05 NOTE — ED ADULT NURSE NOTE - NSFALLUNIVINTERV_ED_ALL_ED
Bed/Stretcher in lowest position, wheels locked, appropriate side rails in place/Call bell, personal items and telephone in reach/Instruct patient to call for assistance before getting out of bed/chair/stretcher/Non-slip footwear applied when patient is off stretcher/[place] to call system/Physically safe environment - no spills, clutter or unnecessary equipment/Purposeful proactive rounding/Room/bathroom lighting operational, light cord in reach

## 2024-03-05 NOTE — ED PROVIDER NOTE - PHYSICAL EXAMINATION
: Chaperoned by QUAN Vann  external: + ~ 2cm x 2cm round fluctuant cyst to right upper external labia majora without active drainage : Chaperoned by QUAN Henderson  external: + ~ 2cm x 2cm round fluctuant cyst to right upper external labia majora without active drainage

## 2024-03-05 NOTE — CONSULT NOTE ADULT - ASSESSMENT
INCOMPLETE NOTE 23y G0 LMP 2/28 with hx recurrent labial abscesses with multiple I&Ds presents with pelvic pain x3 days, now s/p expression of pus and blood from 3x2cm R anterior vulvar abscess in ED.  Patient reports some relief of pain with expression.  Appearance of abscess most consistent with folliculitis.      Recommendations:  - Culture obtained from purulent fluid, f/u results  - Recommend discharge with PO Augmentin x7 days - patient cannot swallow pills so recommend liquid formulation  - Recommend sitz baths, warm compresses  - Recommended patient use tampons rather than menstrual pads during periods to decrease incidence of recurrance  - Patient would like to follow up with [facility], please provide patient with the following information to schedule follow up:  [facility]  Ambulatory Care Unit  Oncology Building, Level C  [address] [place] [address]  [phone]   - Remainder of care per ED    Patient seen and evaluated with Dr. Jabari Ballard PGY2   23y G0 LMP 2/28 with hx recurrent labial abscesses with multiple I&Ds presents with pelvic pain x3 days, now s/p expression of pus and blood from 3x2cm R anterior vulvar abscess in ED.  Patient reports some relief of pain with expression.  Appearance of abscess most consistent with folliculitis.      Recommendations:  - Culture obtained from purulent fluid, f/u results  - Recommend discharge with PO Augmentin x7 days - patient cannot swallow pills so recommend liquid formulation  - Recommend sitz baths, warm compresses  - Recommended patient use tampons rather than menstrual pads during periods to decrease incidence of recurrance  - Patient would like to follow up with [facility], please provide patient with the following information to schedule follow up:  [facility]  Ambulatory Care Unit  Oncology Building, Level C  [address] [place] [address]  [phone]   - Remainder of care per ED    Patient seen and evaluated with Dr. Jabari Ballard PGY2    22y/o G0 LMP 2/28 with hx recurrent vulvar folliculitis c/o pain x3 day, treatment as noted above. Pt to f/u as an outpatient.  Jacky Barboza M.D.

## 2024-03-05 NOTE — ED PROVIDER NOTE - NSFOLLOWUPINSTRUCTIONS_ED_ALL_ED_FT
Take Augmentin one tablet by mouth every 12 hours with food for 7 days   Use sitz baths daily  Keep area clean and dry  Return to ER if any fevers, chills, worsening symptoms or any other concerns     CALL OBGYN CLINIC FOR AN APPOINTMENT [phone]    How to Take a Sitz Bath  A sitz bath is a warm water bath that may be used to care for your rectum, genital area, or the area between your rectum and genitals (perineum). In a sitz bath, the water only comes up to your hips and covers your buttocks. A sitz bath may be done in a bathtub or with a portable sitz bath that fits over the toilet.    Your health care provider may recommend a sitz bath to help:  Relieve pain and discomfort after delivering a baby.  Relieve pain and itching from hemorrhoids or anal fissures.  Relieve pain after certain surgeries.  Relax muscles that are sore or tight.  How to take a sitz bath  Take 2–4 sitz baths a day, or as many as told by your health care provider.    Bathtub sitz bath    A bathtub partially filled with water.  To take a sitz bath in a bathtub:  Partially fill a bathtub with warm water. The water should be deep enough to cover your hips and buttocks when you are sitting in the bathtub.  Follow your health care provider's instructions if you are told to put medicine in the water.  Sit in the water. Open the bathtub drain a little, and leave it open during your bath.  Turn on the warm water again, enough to replace the water that is draining out. Keep the water running throughout your bath. This helps keep the water at the right level and temperature.  Soak in the water for 15–20 minutes, or as long as told by your health care provider.  When you are done, be careful when you stand up. You may feel dizzy.  After the sitz bath, pat yourself dry. Do not rub your skin to dry it.  Over-the-toilet sitz bath    A toilet with a sitz bath sitting inside the toilet bowl.  To take a sitz bath with an over-the-toilet basin:  Follow the 's instructions.  Fill the basin with warm water.  Follow your health care provider's instructions if you were told to put medicine in the water.  Sit on the seat. Make sure the water covers your buttocks and perineum.  Soak in the water for 15–20 minutes, or as long as told by your health care provider.  After the sitz bath, pat yourself dry. Do not rub your skin to dry it.  Clean and dry the basin between uses.  Discard the basin if it cracks, or according to the 's instructions.  Contact a health care provider if:  Your pain or itching gets worse. Stop doing sitz baths if your symptoms get worse.  You have new symptoms. Stop doing sitz baths until you talk with your health care provider.  Summary  A sitz bath is a warm water bath in which the water only comes up to your hips and covers your buttocks.  Your health care provider may recommend a sitz bath to help relieve pain and discomfort after delivering a baby, relieve pain and itching from hemorrhoids or anal fissures, relieve pain after certain surgeries, or help to relax muscles that are sore or tight.  Take 2–4 sitz baths a day, or as many as told by your health care provider. Soak in the water for 15–20 minutes.  Stop doing sitz baths if your symptoms get worse.

## 2024-03-05 NOTE — CONSULT NOTE ADULT - SUBJECTIVE AND OBJECTIVE BOX
GYN Consult Note    INCOMPLETE NOTE     ID#[id number] (Rosa) used for encounter    23y G0 LMP 2/28 with hx recurrent labial abscesses with multiple I&Ds presents with pelvic pain x3 days.  Reports     OB/GYN HISTORY:   Physician:   Ob:   Gyn: Denies fibroids, cysts, PCOS, endometriosis, or history of genital lesions   PMH: Denies    PSH: Denies   Meds:   Allergies:         REVIEW OF SYSTEMS  General: denies fevers, chills, tiredness  Skin/Breast: denies breast pain  Respiratory and Thorax: denies shortness of breath, denies cough  Cardiovascular: denies chest pain and denies palpitations  Gastrointestinal: denies abdominal pain, nausea/ vomiting	  Genitourinary: denies dysuria, increased urinary frequency, urgency	  Constitutional, Cardiovascular, Respiratory, Gastrointestinal, Genitourinary, Musculoskeletal and Integumentary review of systems are normal except as noted. 	      Vital Signs Last 24 Hrs  T(C): 36.8 (05 Mar 2024 19:38), Max: 36.8 (05 Mar 2024 18:33)  T(F): 98.2 (05 Mar 2024 19:38), Max: 98.3 (05 Mar 2024 18:33)  HR: 79 (05 Mar 2024 19:38) (78 - 79)  BP: 110/60 (05 Mar 2024 19:38) (110/60 - 124/71)  BP(mean): --  RR: 18 (05 Mar 2024 19:38) (17 - 18)  SpO2: 100% (05 Mar 2024 19:38) (100% - 100%)    Parameters below as of 05 Mar 2024 18:33  Patient On (Oxygen Delivery Method): room air      PHYSICAL EXAM:   Gen: NAD, alert and oriented x 3  Cardiovascular: RRR  Respiratory: breathing comfortably on RA  Abd: soft, non tender, non-distended  Pelvic: closed/long, no CMT, Uterus: normal size, non tender  Adnexa: non tender, no palpable masses  Speculum Exam: No active bleeding from os.  Physiologic discharge.    Extremities: non-tender b/l, no edema   Skin: warm and well perfused  *Pelvic exam performed with chaperone present    LABS:                RADIOLOGY & ADDITIONAL STUDIES: GYN Consult Note     ID#[id number] (Rosa) and [id number] (Dejah) used for encounter    23y G0 LMP 2/28 with hx recurrent labial abscesses with multiple I&Ds presents with pelvic pain x3 days.  Reports she develops this pain and swelling monthly after each menses.  Of note, patient only uses menstrual pads during menses - does not use tampons.  States that sometimes the pain improves and abscess drains with warm compresses, however states that this time the pain worsened and the area became more swollen and tender with warm compresses, prompting her to present to her gynecologist.  States she made a new patient appointment with a gynecologist yesterday, but they did not have an , so was inaccessible to her.  States that she was told she would get antibiotics prescribed, but nothing was sent to her pharmacy.  Patient now presents to ED for evaluation of her pelvic pain.  Describes pain as burning.  Denies drainage from area, fevers, chills, nausea, vomiting, dysuria, urinary retention, constipation, or diarrhea.  Denies self-stimulation or shaving/waxing in this area.      Of note, patient reports multiple I&D of this abscess in the past.  States her very first one was L sided in 2019.  Reports all have been R sided since then    OB/GYN HISTORY:   Physician: Dr. Anaya Adams  Ob: G0  Gyn: recurrent labial abscesses s/p multiple I&D; Denies fibroids, cysts, abnormal pap smears, STIs  PMH: Congenital heart condition (reports "valve does not close" - cannot further qualify)  PSH: multiple heart surgeries, multiple I&D of vulvar abscesses  Meds: ASA  Allergies: NKDA      Vital Signs Last 24 Hrs  T(C): 36.8 (05 Mar 2024 19:38), Max: 36.8 (05 Mar 2024 18:33)  T(F): 98.2 (05 Mar 2024 19:38), Max: 98.3 (05 Mar 2024 18:33)  HR: 79 (05 Mar 2024 19:38) (78 - 79)  BP: 110/60 (05 Mar 2024 19:38) (110/60 - 124/71)  BP(mean): --  RR: 18 (05 Mar 2024 19:38) (17 - 18)  SpO2: 100% (05 Mar 2024 19:38) (100% - 100%)    Parameters below as of 05 Mar 2024 18:33  Patient On (Oxygen Delivery Method): room air      PHYSICAL EXAM:   Gen: NAD, alert and oriented x 3  Respiratory: breathing comfortably on RA  Abd: soft, non tender, non-distended  Pelvic: approximately 3x2cm area of fluctuance lateral to anterior R labia majora that is tender to palpation, within the center of this is a 1x1cm tense erythematous nodule that is supremely tender to touch.  No drainage at rest, but able to express pus and blood from center nodule - culture collected.  Area of fluctuance does not track into vagina or into labia minora  Extremities: non-tender b/l, no edema   Skin: warm and well perfused  *Pelvic exam performed with chaperone present: CORIE Prince  *Dr. Barboza present at bedside for expression of abscess

## 2024-03-06 LAB
GRAM STN FLD: ABNORMAL
SPECIMEN SOURCE: SIGNIFICANT CHANGE UP

## 2024-03-06 RX ORDER — IBUPROFEN 200 MG
600 TABLET ORAL ONCE
Refills: 0 | Status: COMPLETED | OUTPATIENT
Start: 2024-03-06 | End: 2024-03-06

## 2024-03-06 RX ADMIN — Medication 1 TABLET(S): at 00:51

## 2024-03-06 RX ADMIN — Medication 600 MILLIGRAM(S): at 00:51

## 2024-03-08 LAB
-  AMPICILLIN: SIGNIFICANT CHANGE UP
-  VANCOMYCIN: SIGNIFICANT CHANGE UP
METHOD TYPE: SIGNIFICANT CHANGE UP

## 2024-03-11 LAB
CULTURE RESULTS: ABNORMAL
ORGANISM # SPEC MICROSCOPIC CNT: ABNORMAL
ORGANISM # SPEC MICROSCOPIC CNT: ABNORMAL
SPECIMEN SOURCE: SIGNIFICANT CHANGE UP

## 2024-03-27 ENCOUNTER — APPOINTMENT (OUTPATIENT)
Dept: OBGYN | Facility: HOSPITAL | Age: 24
End: 2024-03-27
Payer: MEDICAID

## 2024-03-27 ENCOUNTER — APPOINTMENT (OUTPATIENT)
Dept: OBGYN | Facility: HOSPITAL | Age: 24
End: 2024-03-27

## 2024-03-27 ENCOUNTER — RESULT REVIEW (OUTPATIENT)
Age: 24
End: 2024-03-27

## 2024-03-27 ENCOUNTER — OUTPATIENT (OUTPATIENT)
Dept: OUTPATIENT SERVICES | Facility: HOSPITAL | Age: 24
LOS: 1 days | End: 2024-03-27

## 2024-03-27 VITALS
BODY MASS INDEX: 27.19 KG/M2 | DIASTOLIC BLOOD PRESSURE: 72 MMHG | TEMPERATURE: 98.2 F | HEIGHT: 61 IN | WEIGHT: 144 LBS | HEART RATE: 74 BPM | SYSTOLIC BLOOD PRESSURE: 140 MMHG

## 2024-03-27 DIAGNOSIS — N76.4 ABSCESS OF VULVA: ICD-10-CM

## 2024-03-27 PROBLEM — Q24.9 CONGENITAL MALFORMATION OF HEART, UNSPECIFIED: Chronic | Status: ACTIVE | Noted: 2024-03-05

## 2024-03-27 PROBLEM — H91.90 UNSPECIFIED HEARING LOSS, UNSPECIFIED EAR: Chronic | Status: ACTIVE | Noted: 2024-03-05

## 2024-03-27 PROCEDURE — 99213 OFFICE O/P EST LOW 20 MIN: CPT | Mod: GE

## 2024-03-28 DIAGNOSIS — Z00.00 ENCOUNTER FOR GENERAL ADULT MEDICAL EXAMINATION WITHOUT ABNORMAL FINDINGS: ICD-10-CM

## 2024-03-28 DIAGNOSIS — N76.4 ABSCESS OF VULVA: ICD-10-CM

## 2024-03-28 LAB
C TRACH RRNA SPEC QL NAA+PROBE: SIGNIFICANT CHANGE UP
N GONORRHOEA RRNA SPEC QL NAA+PROBE: SIGNIFICANT CHANGE UP
SPECIMEN SOURCE: SIGNIFICANT CHANGE UP

## 2024-03-28 NOTE — PHYSICAL EXAM
[Chaperone Present] : A chaperone was present in the examining room during all aspects of the physical examination [Appropriately responsive] : appropriately responsive [Alert] : alert [No Acute Distress] : no acute distress [Labia Minora] : normal [Labia Majora] : normal [Normal] : normal [FreeTextEntry2] : No swelling or abscesses appreciated. No tenderness to palpation.

## 2024-03-28 NOTE — HISTORY OF PRESENT ILLNESS
[FreeTextEntry1] : Patient has  with her at bedside.   23y G0 LMP 2/28 with hx recurrent labial abscesses with multiple I&Ds presents as initial GYN visit after presentation to the ED on 3/5 after I&D of R vulvar abscess. Patient has taken augmentin x 7 days as prescribed and states symptoms have completely resolved. Denies any further swelling, pain, Denies any fevers/chills.   Abscess culture (3/5): E. faecalis, sensitive to Augmentin- reviewed with patient   Ob: G0 Gyn: recurrent labial abscesses s/p multiple I&D; Denies fibroids, cysts, abnormal pap smears, STIs PMH: Congenital heart condition (reports "valve does not close" - cannot further qualify) PSH: multiple heart surgeries, multiple I&D of vulvar abscesses Meds: ASA Allergies: NKDA

## 2024-03-28 NOTE — PLAN
[FreeTextEntry1] : 24 y/o G0 presenting as initial visit for f/u for vulvar folliculitis s/p I&D on 3/5.  #Vulvar Folliculitis - Abscess culture reviewed with patient  - Abscess resolved today on exam - Counseled patient that hard to identify specific cause for recurrent abscesses. Patient is switching to cotton pads and will see if those improve the occurrence of abscesses.   #Health Maintenance - Pap collected, will f/u results - GC collected  RTC 1 year for annual or PRN  D/w Dr. Haider Currie PGY1

## 2024-03-30 LAB — CYTOLOGY SPEC DOC CYTO: SIGNIFICANT CHANGE UP

## 2024-04-11 ENCOUNTER — APPOINTMENT (OUTPATIENT)
Dept: SPEECH THERAPY | Facility: CLINIC | Age: 24
End: 2024-04-11

## 2024-05-05 PROBLEM — Z95.2 H/O AORTIC VALVE REPLACEMENT: Status: ACTIVE | Noted: 2020-09-08

## 2024-05-05 PROBLEM — I07.1 MODERATE TRICUSPID REGURGITATION: Status: ACTIVE | Noted: 2021-08-19

## 2024-05-05 PROBLEM — Z13.6 SCREENING FOR CARDIOVASCULAR CONDITION: Status: ACTIVE | Noted: 2021-04-10

## 2024-05-05 PROBLEM — I34.0 MODERATE MITRAL REGURGITATION: Status: ACTIVE | Noted: 2021-08-19

## 2024-05-05 PROBLEM — I35.1 MODERATE AORTIC REGURGITATION: Status: ACTIVE | Noted: 2021-08-19

## 2024-05-07 ENCOUNTER — NON-APPOINTMENT (OUTPATIENT)
Age: 24
End: 2024-05-07

## 2024-05-08 ENCOUNTER — NON-APPOINTMENT (OUTPATIENT)
Age: 24
End: 2024-05-08

## 2024-05-08 ENCOUNTER — APPOINTMENT (OUTPATIENT)
Dept: CARDIOLOGY | Facility: CLINIC | Age: 24
End: 2024-05-08
Payer: MEDICAID

## 2024-05-08 VITALS
HEIGHT: 61 IN | HEART RATE: 83 BPM | WEIGHT: 148 LBS | SYSTOLIC BLOOD PRESSURE: 125 MMHG | OXYGEN SATURATION: 98 % | BODY MASS INDEX: 27.94 KG/M2 | DIASTOLIC BLOOD PRESSURE: 77 MMHG

## 2024-05-08 DIAGNOSIS — Z95.2 PRESENCE OF PROSTHETIC HEART VALVE: ICD-10-CM

## 2024-05-08 DIAGNOSIS — Z13.6 ENCOUNTER FOR SCREENING FOR CARDIOVASCULAR DISORDERS: ICD-10-CM

## 2024-05-08 DIAGNOSIS — I07.1 RHEUMATIC TRICUSPID INSUFFICIENCY: ICD-10-CM

## 2024-05-08 DIAGNOSIS — I34.0 NONRHEUMATIC MITRAL (VALVE) INSUFFICIENCY: ICD-10-CM

## 2024-05-08 DIAGNOSIS — I35.1 NONRHEUMATIC AORTIC (VALVE) INSUFFICIENCY: ICD-10-CM

## 2024-05-08 PROCEDURE — 93000 ELECTROCARDIOGRAM COMPLETE: CPT

## 2024-05-08 PROCEDURE — G2211 COMPLEX E/M VISIT ADD ON: CPT | Mod: NC,1L

## 2024-05-08 PROCEDURE — 99214 OFFICE O/P EST MOD 30 MIN: CPT | Mod: 25

## 2024-08-29 ENCOUNTER — NON-APPOINTMENT (OUTPATIENT)
Age: 24
End: 2024-08-29

## 2024-08-30 ENCOUNTER — APPOINTMENT (OUTPATIENT)
Dept: ORTHOPEDIC SURGERY | Facility: CLINIC | Age: 24
End: 2024-08-30
Payer: MEDICAID

## 2024-08-30 VITALS — HEIGHT: 61 IN | WEIGHT: 148 LBS | BODY MASS INDEX: 27.94 KG/M2

## 2024-08-30 DIAGNOSIS — S40.012A CONTUSION OF LEFT SHOULDER, INITIAL ENCOUNTER: ICD-10-CM

## 2024-08-30 DIAGNOSIS — M75.02 ADHESIVE CAPSULITIS OF LEFT SHOULDER: ICD-10-CM

## 2024-08-30 DIAGNOSIS — M75.42 IMPINGEMENT SYNDROME OF LEFT SHOULDER: ICD-10-CM

## 2024-08-30 DIAGNOSIS — S40.011A CONTUSION OF RIGHT SHOULDER, INITIAL ENCOUNTER: ICD-10-CM

## 2024-08-30 PROCEDURE — 72040 X-RAY EXAM NECK SPINE 2-3 VW: CPT

## 2024-08-30 PROCEDURE — 99213 OFFICE O/P EST LOW 20 MIN: CPT

## 2024-08-30 PROCEDURE — 73010 X-RAY EXAM OF SHOULDER BLADE: CPT | Mod: LT

## 2024-08-30 PROCEDURE — 73030 X-RAY EXAM OF SHOULDER: CPT | Mod: LT

## 2024-08-30 RX ORDER — CLINDAMYCIN HYDROCHLORIDE 300 MG/1
300 CAPSULE ORAL
Qty: 4 | Refills: 1 | Status: ACTIVE | COMMUNITY
Start: 2024-08-30 | End: 1900-01-01

## 2024-08-30 RX ORDER — NAPROXEN 500 MG/1
500 TABLET ORAL
Qty: 20 | Refills: 0 | Status: ACTIVE | COMMUNITY
Start: 2024-08-30 | End: 1900-01-01

## 2024-08-30 NOTE — DISCUSSION/SUMMARY
[Medication Risks Reviewed] : Medication risks reviewed [de-identified] : Rx:  Naproxen If pain persist could f/u to obtain PT ----------------------------------------------------------------------------  All relevant imaging studies pertinent to today's visit, including x-rays, MRI's and/or other advanced imaging studies (CT/etc) were independently interpreted and reviewed with the patient as needed. Implications of the studies together with the patient's clinical picture were discussed to formulate a working diagnosis and management options were detailed.  The patient was advised of the diagnosis.  The natural history of the pathology was explained in full. All questions were answered.  The risks and benefits of conservative and interventional treatment alternatives were explained to the patient   ----------------------------------------------------------------------------  Patient warned of specific risks of medication related to bleeding, GI issues, increase blood pressure, and cardiac risks in addition to additional risks.  Patient advised to discuss with PMD  if any presence of stated issues.

## 2024-08-30 NOTE — HISTORY OF PRESENT ILLNESS
[Sudden] : sudden [10] : 10 [Frequent] : frequent [de-identified] : This is Ms. CALLUM DEE  a 23 year old female with recent injury of shoulder when she hit it on a rock while swimming about 2 weeks ago. Hx of prev shoulder injury treated by inj. 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  [] : no [FreeTextEntry7] : into hand [FreeTextEntry9] : brace [de-identified] : urgent care  [de-identified] : physical therapy

## 2024-08-30 NOTE — IMAGING
[Left] : left shoulder [There are no fractures, subluxations or dislocations. No significant abnormalities are seen] : There are no fractures, subluxations or dislocations. No significant abnormalities are seen [de-identified] :   ----------------------------------------------------------------------------   Left shoulder exam:    Skin: no significant pertinent finding  Inspection: no obvious deformity, no obvious masses, no swelling, no effusion, no atrophy  ROM:     FF: 170     ER: 80     IR: T6  Tenderness: diffuse tenderness     (neg) Anterior/Biceps:     (neg) Posterior     (neg) Lateral     (+) Trapezius     (neg) Scapula     (neg) AC joint     (neg) Crepitus with ROM  Stability:     (neg) Translation     (neg) Apprehension     (neg) Clicking  Additional tests:     (+) Neer's     (+)Hawkin's     (neg) Lorenzo's     (neg) Speed     (neg) Cross chest adduction      (+) pain with abduction and external rotation      (+) pain with FF and external rotation  Strength:     FF: 5/5     ER: 5/5     IR: 5/5     Biceps: 5/5     Triceps: 5/5     Distal: 5/5  Neuro: In tact to light touch throughout  Vascularity: Extremity warm and well perfused  [No bony abnormalities] : No bony abnormalities

## 2024-09-03 RX ORDER — AMOXICILLIN 500 MG/1
500 TABLET, FILM COATED ORAL
Qty: 4 | Refills: 2 | Status: ACTIVE | COMMUNITY
Start: 2024-09-03 | End: 1900-01-01

## 2024-09-22 NOTE — ED ADULT NURSE NOTE - ISOLATION TYPE:
Problem: Potential for Falls  Goal: Patient will remain free of falls  Description: INTERVENTIONS:  - Educate patient/family on patient safety including physical limitations  - Instruct patient to call for assistance with activity   - Consult OT/PT to assist with strengthening/mobility   - Keep Call bell within reach  - Keep bed low and locked with side rails adjusted as appropriate  - Keep care items and personal belongings within reach  - Initiate and maintain comfort rounds  - Make Fall Risk Sign visible to staff  - Initiate/Maintain bed alarm  - Obtain necessary fall risk management equipment: bed alarm, call bell,  socks  - Apply yellow socks and bracelet for high fall risk patients  - Consider moving patient to room near nurses station  Outcome: Progressing     Problem: PAIN - ADULT  Goal: Verbalizes/displays adequate comfort level or baseline comfort level  Description: Interventions:  - Encourage patient to monitor pain and request assistance  - Assess pain using appropriate pain scale  - Administer analgesics based on type and severity of pain and evaluate response  - Implement non-pharmacological measures as appropriate and evaluate response  - Consider cultural and social influences on pain and pain management  - Notify physician/advanced practitioner if interventions unsuccessful or patient reports new pain  Outcome: Progressing     Problem: INFECTION - ADULT  Goal: Absence or prevention of progression during hospitalization  Description: INTERVENTIONS:  - Assess and monitor for signs and symptoms of infection  - Monitor lab/diagnostic results  - Monitor all insertion sites, i.e. indwelling lines, tubes, and drains  - Monitor endotracheal if appropriate and nasal secretions for changes in amount and color  - Sarona appropriate cooling/warming therapies per order  - Administer medications as ordered  - Instruct and encourage patient and family to use good hand hygiene technique  - Identify and  instruct in appropriate isolation precautions for identified infection/condition  Outcome: Progressing  Goal: Absence of fever/infection during neutropenic period  Description: INTERVENTIONS:  - Monitor WBC    Outcome: Progressing     Problem: SAFETY ADULT  Goal: Patient will remain free of falls  Description: INTERVENTIONS:  - Educate patient/family on patient safety including physical limitations  - Instruct patient to call for assistance with activity   - Consult OT/PT to assist with strengthening/mobility   - Keep Call bell within reach  - Keep bed low and locked with side rails adjusted as appropriate  - Keep care items and personal belongings within reach  - Initiate and maintain comfort rounds  - Make Fall Risk Sign visible to staff  - Initiate/Maintain bed alarm  - Obtain necessary fall risk management equipment: bed alarm, call bell,  socks  - Apply yellow socks and bracelet for high fall risk patients  - Consider moving patient to room near nurses station  Outcome: Progressing  Goal: Maintain or return to baseline ADL function  Description: INTERVENTIONS:  -  Assess patient's ability to carry out ADLs; assess patient's baseline for ADL function and identify physical deficits which impact ability to perform ADLs (bathing, care of mouth/teeth, toileting, grooming, dressing, etc.)  - Assess/evaluate cause of self-care deficits   - Assess range of motion  - Assess patient's mobility; develop plan if impaired  - Assess patient's need for assistive devices and provide as appropriate  - Encourage maximum independence but intervene and supervise when necessary  - Involve family in performance of ADLs  - Assess for home care needs following discharge   - Consider OT consult to assist with ADL evaluation and planning for discharge  - Provide patient education as appropriate  Outcome: Progressing  Goal: Maintains/Returns to pre admission functional level  Description: INTERVENTIONS:  - Perform AM-PAC 6 Click  Basic Mobility/ Daily Activity assessment daily.  - Set and communicate daily mobility goal to care team and patient/family/caregiver.   - Collaborate with rehabilitation services on mobility goals if consulted  - Perform Range of Motion 3 times a day.  - Reposition patient every 2 hours.  - Dangle patient 3 times a day  - Stand patient 3 times a day  - Ambulate patient 3 times a day  - Out of bed to chair 3 times a day   - Out of bed for meals 3 times a day  - Out of bed for toileting  - Record patient progress and toleration of activity level   Outcome: Progressing     Problem: DISCHARGE PLANNING  Goal: Discharge to home or other facility with appropriate resources  Description: INTERVENTIONS:  - Identify barriers to discharge w/patient and caregiver  - Arrange for needed discharge resources and transportation as appropriate  - Identify discharge learning needs (meds, wound care, etc.)  - Arrange for interpretive services to assist at discharge as needed  - Refer to Case Management Department for coordinating discharge planning if the patient needs post-hospital services based on physician/advanced practitioner order or complex needs related to functional status, cognitive ability, or social support system  Outcome: Progressing     Problem: Knowledge Deficit  Goal: Patient/family/caregiver demonstrates understanding of disease process, treatment plan, medications, and discharge instructions  Description: Complete learning assessment and assess knowledge base.  Interventions:  - Provide teaching at level of understanding  - Provide teaching via preferred learning methods  Outcome: Progressing     Problem: Prexisting or High Potential for Compromised Skin Integrity  Goal: Skin integrity is maintained or improved  Description: INTERVENTIONS:  - Identify patients at risk for skin breakdown  - Assess and monitor skin integrity  - Assess and monitor nutrition and hydration status  - Monitor labs   - Assess for  None incontinence   - Turn and reposition patient  - Assist with mobility/ambulation  - Relieve pressure over bony prominences  - Avoid friction and shearing  - Provide appropriate hygiene as needed including keeping skin clean and dry  - Evaluate need for skin moisturizer/barrier cream  - Collaborate with interdisciplinary team   - Patient/family teaching  - Consider wound care consult   Outcome: Progressing     Problem: GASTROINTESTINAL - ADULT  Goal: Minimal or absence of nausea and/or vomiting  Description: INTERVENTIONS:  - Administer IV fluids if ordered to ensure adequate hydration  - Maintain NPO status until nausea and vomiting are resolved  - Nasogastric tube if ordered  - Administer ordered antiemetic medications as needed  - Provide nonpharmacologic comfort measures as appropriate  - Advance diet as tolerated, if ordered  - Consider nutrition services referral to assist patient with adequate nutrition and appropriate food choices  Outcome: Progressing

## 2024-10-02 ENCOUNTER — OUTPATIENT (OUTPATIENT)
Dept: OUTPATIENT SERVICES | Facility: HOSPITAL | Age: 24
LOS: 1 days | Discharge: HOME | End: 2024-10-02

## 2024-10-02 ENCOUNTER — APPOINTMENT (OUTPATIENT)
Dept: SPEECH THERAPY | Facility: CLINIC | Age: 24
End: 2024-10-02

## 2024-10-02 NOTE — REASON FOR VISIT
[Follow-Up] : follow-up for [Cochlear Implant] : cochlear implant [[employer] ] : provided by [employer]   [Time Spent: ____ minutes] : Total time spent using  services: [unfilled] minutes. The patient's primary language is not English thus required  services. [Interpreters_IDNumber] : [id number] [Interpreters_FullName] : Marcin [FreeTextEntry3] : Video [TWNoteComboBox1] : American Sign Language

## 2024-10-02 NOTE — ASSESSMENT
[FreeTextEntry1] : Right ear impedances WNL  Data Logging 11.7 hours  900Hz 10 max PW 37  Troubleshooting: Coil cable is stiff and intermittent. Tried a 4M magnet which is better. Derek need to order one in black Changed raymond cover to improve sound Called [employer] Customer Service-ordered replacement coil cable and black 4M magnet through insurance. Patient did not need a new battery.  P1 Checked counted T levels. No change to C levels. Volume 6. No SCAN P2 SCAN  Patient inquired about speech therapy because her family does not talk to her at home and she is concerned about her voice quality. I do not think 30 minutes of therapy will change her voice quality at this point. Encouraged her to engage in communication with her family and we downloaded the [employer] Rosa.

## 2024-10-02 NOTE — HISTORY OF PRESENT ILLNESS
[FreeTextEntry1] : Right ear internal Freedom Cochlear implant with an external black N7 processor and 3M magnet. Implanted by Dr Lee 19 years ago.   Patient is Sign Language Dependent. [FreeTextEntry8] : Returns with complaints that sound is intermittent, the 3M magnet no longer connects well and the sound quality is poor.

## 2024-10-03 DIAGNOSIS — H90.3 SENSORINEURAL HEARING LOSS, BILATERAL: ICD-10-CM

## 2024-11-06 ENCOUNTER — NON-APPOINTMENT (OUTPATIENT)
Age: 24
End: 2024-11-06

## 2024-11-06 ENCOUNTER — APPOINTMENT (OUTPATIENT)
Dept: CARDIOLOGY | Facility: CLINIC | Age: 24
End: 2024-11-06
Payer: MEDICAID

## 2024-11-06 ENCOUNTER — APPOINTMENT (OUTPATIENT)
Dept: CV DIAGNOSITCS | Facility: HOSPITAL | Age: 24
End: 2024-11-06

## 2024-11-06 ENCOUNTER — OUTPATIENT (OUTPATIENT)
Dept: OUTPATIENT SERVICES | Facility: HOSPITAL | Age: 24
LOS: 1 days | End: 2024-11-06
Payer: MEDICAID

## 2024-11-06 ENCOUNTER — RESULT REVIEW (OUTPATIENT)
Age: 24
End: 2024-11-06

## 2024-11-06 VITALS
WEIGHT: 145 LBS | HEIGHT: 61 IN | BODY MASS INDEX: 27.38 KG/M2 | SYSTOLIC BLOOD PRESSURE: 117 MMHG | DIASTOLIC BLOOD PRESSURE: 76 MMHG | OXYGEN SATURATION: 98 % | HEART RATE: 74 BPM

## 2024-11-06 DIAGNOSIS — I35.1 NONRHEUMATIC AORTIC (VALVE) INSUFFICIENCY: ICD-10-CM

## 2024-11-06 DIAGNOSIS — I34.0 NONRHEUMATIC MITRAL (VALVE) INSUFFICIENCY: ICD-10-CM

## 2024-11-06 DIAGNOSIS — I07.1 RHEUMATIC TRICUSPID INSUFFICIENCY: ICD-10-CM

## 2024-11-06 DIAGNOSIS — Z95.2 PRESENCE OF PROSTHETIC HEART VALVE: ICD-10-CM

## 2024-11-06 DIAGNOSIS — Z13.6 ENCOUNTER FOR SCREENING FOR CARDIOVASCULAR DISORDERS: ICD-10-CM

## 2024-11-06 PROCEDURE — 93306 TTE W/DOPPLER COMPLETE: CPT | Mod: 26

## 2024-11-06 PROCEDURE — 99214 OFFICE O/P EST MOD 30 MIN: CPT | Mod: 25

## 2024-11-06 PROCEDURE — G2211 COMPLEX E/M VISIT ADD ON: CPT | Mod: NC

## 2024-11-06 PROCEDURE — 93356 MYOCRD STRAIN IMG SPCKL TRCK: CPT

## 2024-11-06 PROCEDURE — 93000 ELECTROCARDIOGRAM COMPLETE: CPT

## 2024-12-30 NOTE — ED PROVIDER NOTE - CARDIAC, MLM
Received fax from [facility] Drug Dispense Notification for your information.    Drug: Gocovri 137 mg Cap.    The above patient has a prescription of Gocovri 137 mg Cap filled at out pharmacy.  We will ship vis UPS/[employer] on 12/26/2024.    Nothing further   --------------------------------------------  Outpatient Medication Detail     Disp Refills Start End    Amantadine HCl ER (Gocovri) 137 MG CAPSULE SR 24 HR 60 capsule 5 10/4/2024 --    Sig - Route: Take 137 mg by mouth in the morning and 137 mg in the evening. - Oral    Sent to pharmacy as: Amantadine HCl  MG Oral Capsule Extended Release 24 Hour (Gocovri)    Class: Eprescribe       Normal rate, regular rhythm.  Heart sounds S1, S2.  No murmurs, rubs or gallops.

## 2025-01-30 ENCOUNTER — NON-APPOINTMENT (OUTPATIENT)
Age: 25
End: 2025-01-30

## 2025-02-21 RX ORDER — AMOXICILLIN 250 MG/5ML
250 POWDER, FOR SUSPENSION ORAL
Qty: 1 | Refills: 0 | Status: ACTIVE | COMMUNITY
Start: 2025-02-21 | End: 1900-01-01

## 2025-03-12 ENCOUNTER — APPOINTMENT (OUTPATIENT)
Dept: INTERNAL MEDICINE | Facility: CLINIC | Age: 25
End: 2025-03-12

## 2025-06-11 NOTE — ED PROVIDER NOTE - ED STEMI HIDDEN
Hospitalist Progress Note    NAME:   Hyacinth Patterson   : 1958   MRN: [id number]     Date/Time: 2025 9:25 AM  Patient PCP: Jeffery Alexis MD      Assessment / Plan:  Acute on chronic blood loss anemia  Recurrent GI bleed  Known AVMs  GERD  Diarrhea   S/p PRBC transfusion, hbg stable at 7.5  Continue twice daily PPI  Cdiff is pending  Discussed with  Dr Burkett from cardiology, no evidence of AS on any echo reviewed.  Did not recommend repeat Echo-order canceled.  He recommended to discontinue plavix permanently.  Cont' ASA alone for recent stent.  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%  Concern for severe aortic stenosis  PAD  History of chronic mesenteric ischemia  Essential hypertension  Hyperlipidemia  Continue aspirin, dc'ed Plavix   Continue PTA atorvastatin, metoprolol     Schizophrenia  Not currently on medication     Reactive airway disease  Continue PTA montelukast  DuoNeb every 4 hours as needed     Financial constraints  CM consulted, appreciate recs            Medical Decision Making:   I personally reviewed labs  I personally reviewed imaging  Toxic drug monitoring  I personally reviewed EKG  Discussed case with: RNLEIGH, discussed plan of care and dispo during round        Code Status: full  DVT Prophylaxis: scd/pt is ambulatory    Subjective:   Diarrhea -4 episodes today associated with abd pain.  Stool was brown per pt. No bm or abd pain this am. She thought it was related to her dinner.    Discussed with RN.      Objective:     VITALS:   Last 24hrs VS reviewed since prior progress note. Most recent are:  Patient Vitals for the past 24 hrs:   BP Temp Temp src Pulse Resp SpO2   25 0816 -- -- -- -- -- 97 %   25 0730      GI PROGRESS NOTE  Ellyn Guillen PA-C  [phone] NP in-hospital cell phone M-F until 4:30  After 5pm or on weekends, please call  for physician on call    NAME:Hyacinth Patterson :1958 MRN:[id number]   ATTG: [unfilled]   PCP: Jeffery Alexis MD  Date/Time:  6/10/2025 12:55 PM     Reason for following: anemia     Assessment:   Likely some degree ongoing bleeding from known SB AVMs  Acute on Chronic Anemia, exacerbated by anticoagulation needs in setting of recent PCI on DAPT and small PE, on Apixaban  Cardiology with plan to continue asa 81mg and d/c plavix  Hematology with recommendation of no DOAC at this time  IVC filter successfully placed 25   Appreciate cardiology & hematology expertise     Plan:        Continue octreotide subQ - may decrease AVM-related bleeding   Consider IV iron therapy & transfusion for Hgb > 7 as alternative if octreotide not feasible  Follow CBC and bowel movements   Consider pill cam if pt has recurrent bleeding to assess need for push enteroscopy.      Plan discussed with Dr. Rojas   Subjective:   BM today brown, no melena or hematochezia. Doing well after IVC filter placement yesterday.     Review of Systems: see HPI   Objective:   VITALS:   Last 24hrs VS reviewed since prior progress note. Most recent are:  Vitals:    06/10/25 0821   BP:    Pulse:    Resp:    Temp:    SpO2: 95%     No intake or output data in the 24 hours ending 06/10/25 1255  PHYSICAL EXAM:  General: WD, WN. Alert, cooperative, no acute distress    HEENT: NC, Atraumatic. Anicteric sclerae.  Lungs:  Normal respiratory effort   Abdomen: Soft, Non distended, Non tender.  +Bowel sounds, no HSM  Neurologic:  Alert and oriented X 3.  No acute neurological distress   Psych:   Good insight. Not anxious nor agitated.    Lab and Radiology Data Reviewed: (see below)    Medications Reviewed: (see below)  PMH/SH reviewed - no change compared to    GI PROGRESS NOTE  GOPAL Harris-C  [phone] NP in-hospital cell phone M-F until 4:30  After 5pm or on weekends, please call  for physician on call    NAME:Hyacinth Patterson :1958 MRN:[id number]   ATTG: [unfilled]   PCP: Jeffery Alexis MD  Date/Time:  2025 3:43 PM     Reason for following: anemia     Assessment:   Likely some degree ongoing bleeding from known SB AVMs  Acute on Chronic Anemia, exacerbated by anticoagulation needs in setting of recent PCI on DAPT and small PE, on Apixaban  Cardiology with plan to continue asa 81mg and d/c plavix  Hematology with recommendation of no DOAC at this time  IVC filter successfully placed 25   Appreciate cardiology & hematology expertise     Plan:   Octreotide subQ may decrease potential AVM-related bleeding, however commonly have issues getting this as outpatient   Continue with plan for IV iron therapy   Nothing further to add from a GI standpoint.  GI signing-off.  Please call with any questions.  Thank you for this consult.    Plan discussed with Dr. Goldman   GI Attg Addendum- Pt d/w GOPAL Knox. Agree with eval and plan as noted. Will sign off.  Jose Luis Goldman MD  Subjective:   Pt had one BM this morning - brown in color. Denies melena or hematochezia.     Complaint Y/N Description   Abdominal Pain N    Hematemesis N    Hematochezia N    Melena N    Constipation     Diarrhea     Dyspepsia     Dysphagia     Jaundiced     Nausea/vomiting N      Review of Systems: see HPI   Objective:   VITALS:   Last 24hrs VS reviewed since prior progress note. Most recent are:  Vitals:    25 0913   BP:    Pulse:    Resp:    Temp:    SpO2: 99%     No intake or output data in the 24 hours ending 25 1543  PHYSICAL EXAM:  General: WD, WN. Alert, cooperative, no acute distress    HEENT: NC, Atraumatic. Anicteric sclerae.  Lungs:  Normal respiratory effort   Abdomen: Soft, Non distended, Non tender.  +Bowel sounds, no    Patient determined to be stable for discharge by attending provider. I have reviewed the discharge instructions and follow-up appointments with the PCP. They verbalized understanding and all questions were answered to their satisfaction. No complaints or further questions were expressed.       New medications: Appropriate educational materials and medication side effect teaching were provided.       PIV and telemetry monitoring were removed prior to discharge.     All personal items collected during admission were returned to the patient prior to discharge.    Amber Palacio RN       Physician Progress Note      PATIENT:               SOO GOMEZ  CSN #:                  [id number]  :                       1958  ADMIT DATE:       2025 1:29 AM  DISCH DATE:  RESPONDING  PROVIDER #:        Nati Ugarte MD          QUERY TEXT:    Gastrointestinal bleeding is documented in the medical record H&P by   Prateek Cartagena, DO  2025.  Please specify the underlying cause:    The clinical indicators include:  > H&P by Prateek Cartagena, DO 2025- \"Recurrent GI bleed  Known AVMs\"  > Gastroenterology 2025- \"Likely some degree ongoing bleeding from SB   AVMs- \"patient was hospitalized - for acute on chronic GI bleed   undergoing EGD  demonstrating multiple AVMs\"  \"Hold Apixaban\"  Options provided:  -- GI bleeding related to extrinsic circulating anticoagulants  -- GI bleeding related to hemorrhoids  -- GI bleeding related to infectious colitis  -- GI bleeding related to ischemic colitis  -- GI bleeding related to, Please document cause.  -- GI bleeding related to SB AVM  -- Other - I will add my own diagnosis  -- Disagree - Not applicable / Not valid  -- Disagree - Clinically unable to determine / Unknown  -- Refer to Clinical Documentation Reviewer    PROVIDER RESPONSE TEXT:    This patient has GI bleeding related to extrinsic circulating anticoagulants.    Query created by: Ruba Celis on 2025 2:24 PM      Electronically signed by:  Nati Ugarte MD 2025 3:51 PM           ..End of Shift Note    Bedside shift change report given to ADRIANA Clark (oncoming nurse) by Nathalie Hoffman RN (offgoing nurse).  Report included the following information SBAR, Kardex, Intake/Output, and MAR    Shift worked:  0402-9312     Shift summary and any significant changes:     Pt received all scheduled meds per MAR. Pt has no complaints of pain. No PRNs given. Comfort rounds completed.      Concerns for physician to address:       Zone phone for oncoming shift:          Activity:  Level of Assistance: Independent  Number times ambulated in hallways past shift: 0  Number of times OOB to chair past shift: 0    Cardiac:   Cardiac Monitoring: Yes      Cardiac Rhythm: Sinus rhythm    Access:  Current line(s): PIV     Genitourinary:   Urinary Status: Voiding, Bathroom privileges    Respiratory:   O2 Device: None (Room air)  Chronic home O2 use?: NO  Incentive spirometer at bedside: NO    GI:  Last BM (including prior to admit): 06/10/25  Current diet:  ADULT DIET; Regular  Passing flatus: YES    Pain Management:   Patient states pain is manageable on current regimen: YES    Skin:  Edenilson Scale Score: 22  Interventions: Wound Offloading (Prevention Methods): Bed, pressure reduction mattress, Repositioning, Pillows    Patient Safety:  Fall Risk: Nursing Judgement-Fall Risk High(Add Comments): No  Fall Risk Interventions  Nursing Judgement-Fall Risk High(Add Comments): No  Toilet Every 2 Hours-In Advance of Need: No (Comment)  Hourly Visual Checks: In bed, Awake  Fall Visual Posted: Socks, Fall sign posted  Room Door Open: Deferred to promote rest  Alarm On: Bed  Patient Moved Closer to Nursing Station: No    Active Consults:   IP CONSULT TO GI  IP CONSULT TO CARDIOLOGY  IP CONSULT TO CASE MANAGEMENT  IP CONSULT TO HEMATOLOGY    Length of Stay:  Expected LOS: 4  Actual LOS: 4    Nathalie Hoffman RN                            ..End of Shift Note    Bedside shift change report given to ADRIANA Darling (oncoming nurse) by Nathalie Hoffman RN (offgoing nurse).  Report included the following information SBAR, Kardex, Intake/Output, and MAR    Shift worked:  9591-0851     Shift summary and any significant changes:     Pt received all scheduled meds per MAR. Pt C diff resulted negative, taken off enteric precautions. No pain stated throughout shift. Comfort rounds completed.      Concerns for physician to address:       Zone phone for oncoming shift:          Activity:  Level of Assistance: Independent  Number times ambulated in hallways past shift: 0  Number of times OOB to chair past shift: 0    Cardiac:   Cardiac Monitoring: Yes      Cardiac Rhythm: Sinus rhythm    Access:  Current line(s): PIV     Genitourinary:   Urinary Status: Voiding, Bathroom privileges    Respiratory:   O2 Device: None (Room air)  Chronic home O2 use?: NO  Incentive spirometer at bedside: NO    GI:  Last BM (including prior to admit): 06/07/25  Current diet:  Diet NPO  Passing flatus: YES    Pain Management:   Patient states pain is manageable on current regimen: YES    Skin:  Edenilson Scale Score: 22  Interventions: Wound Offloading (Prevention Methods): Bed, pressure reduction mattress, Pillows    Patient Safety:  Fall Risk: Nursing Judgement-Fall Risk High(Add Comments): No  Fall Risk Interventions  Nursing Judgement-Fall Risk High(Add Comments): No  Toilet Every 2 Hours-In Advance of Need: No (Comment) (pt up ad ebonie)  Hourly Visual Checks: In bed, Awake  Fall Visual Posted: Socks  Room Door Open: Deferred to promote rest  Alarm On: Bed  Patient Moved Closer to Nursing Station: No    Active Consults:   IP CONSULT TO GI  IP CONSULT TO CARDIOLOGY  IP CONSULT TO CASE MANAGEMENT  IP CONSULT TO HEMATOLOGY    Length of Stay:  Expected LOS: 2  Actual LOS: 1    Nathalie Hoffman RN                             .End of Shift Note    Bedside shift change report given to ADRIANA BOSWELL (oncoming nurse) by Guerita Corrigan RN (offgoing nurse).  Report included the following information SBAR, Kardex, and MAR    Shift worked:  2887-0190     Shift summary and any significant changes:    Medications given per MAR and education provided. No acute distress overnight. Patient is ambulatory to the bathroom. Patient denied pain and any symptoms. Vital signs remain stable. IVC Filter site remains free of drainage or signs of infection. Rounding and hourly care complete.     Concerns for physician to address:  **         Guerita Corrigan RN                            .End of Shift Note    Bedside shift change report given to ADRIANA Herrera (oncoming nurse) by Rosa Jimenez RN (offgoing nurse).  Report included the following information SBAR, Kardex, and MAR    Shift worked: 7a-7p     Shift summary and any significant changes:    Medications given per MAR and education provided. GI and Cardio consults called. Pt is a standby assist. Diet advanced to regular.      Concerns for physician to address: N/A     Zone phone for oncoming shift:  [phone]       Activity:     Number times ambulated in hallways past shift: 0  Number of times OOB to chair past shift: 2    Cardiac:   Cardiac Monitoring: Yes      Cardiac Rhythm: Sinus rhythm    Access:  Current line(s): PIV     Genitourinary:   Urinary Status: Voiding, Bathroom privileges    Respiratory:   O2 Device: None (Room air)  Chronic home O2 use?: NO  Incentive spirometer at bedside: NO    GI:  Last BM (including prior to admit): 06/06/25  Current diet:  ADULT DIET; Regular  Passing flatus: YES    Pain Management:   Patient states pain is manageable on current regimen: YES    Skin:  Edenilson Scale Score: 21  Interventions: Wound Offloading (Prevention Methods): Bed, pressure reduction mattress, Elevate heels, Pillows, Repositioning, Turning    Patient Safety:  Fall Risk: Nursing Judgement-Fall Risk High(Add Comments): Yes  Fall Risk Interventions  Nursing Judgement-Fall Risk High(Add Comments): Yes  Toilet Every 2 Hours-In Advance of Need: Yes  Hourly Visual Checks: Awake, In bed  Fall Visual Posted: Armband, Socks  Room Door Open: Yes  Alarm On: Bed  Patient Moved Closer to Nursing Station: No    Active Consults:   IP CONSULT TO GI  IP CONSULT TO CARDIOLOGY  IP CONSULT TO CASE MANAGEMENT    Length of Stay:  Expected LOS: 2  Actual LOS: 0    Rosa Jimenez, RN                             .End of Shift Note    Bedside shift change report given to ADRIANA Krause (oncoming nurse) by Rosa Jimenez RN (offgoing nurse).  Report included the following information SBAR, Kardex, and MAR    Shift worked: 7a-7p     Shift summary and any significant changes:    Medications given per MAR and education provided. Hematology consult completed. Pt is a standby assist. Awaiting C-Diff results.      Concerns for physician to address: N/A     Zone phone for oncoming shift:  [phone]       Activity:     Number times ambulated in hallways past shift: 0  Number of times OOB to chair past shift: 3    Cardiac:   Cardiac Monitoring: Yes      Cardiac Rhythm: Sinus rhythm    Access:  Current line(s): PIV     Genitourinary:   Urinary Status: Voiding, Bathroom privileges    Respiratory:   O2 Device: None (Room air)  Chronic home O2 use?: NO  Incentive spirometer at bedside: NO    GI:  Last BM (including prior to admit): 06/07/25  Current diet:  ADULT DIET; Regular  Passing flatus: YES    Pain Management:   Patient states pain is manageable on current regimen: YES    Skin:  Edenilson Scale Score: 22  Interventions: Wound Offloading (Prevention Methods): Bed, pressure reduction mattress, Elevate heels, Pillows, Repositioning, Turning    Patient Safety:  Fall Risk: Nursing Judgement-Fall Risk High(Add Comments): No  Fall Risk Interventions  Nursing Judgement-Fall Risk High(Add Comments): No  Toilet Every 2 Hours-In Advance of Need: No (Comment) (Independent)  Hourly Visual Checks: Awake, In bed  Fall Visual Posted: Socks  Room Door Open: Deferred to promote rest  Alarm On: Bed  Patient Moved Closer to Nursing Station: No    Active Consults:   IP CONSULT TO GI  IP CONSULT TO CARDIOLOGY  IP CONSULT TO CASE MANAGEMENT  IP CONSULT TO HEMATOLOGY    Length of Stay:  Expected LOS: 2  Actual LOS: 1    Rosa Jimenez RN                             .End of Shift Note    Bedside shift change report given to ADRIANA VERDUGO (oncoming nurse) by Guerita Corrigan RN (offgoing nurse).  Report included the following information SBAR, Kardex, and MAR    Shift worked:  9224-3610     Shift summary and any significant changes:    Patient arrived as a transfer [facility], stable. Vital signs stable. Patient finished 1 unit of PRBCs on arrival. Attempted to draw labs x2 as well as another nurse, unsuccessful. Phlebotomy obtained labs. Patient denied lightheadedness, pain and other symptoms. MD bedside assessed patient. Vital signs remain stable. Dual skin complete. No acute distress overnight. Patient is stand by assist and/or ambulatory to the bathroom. Call bell in reach. Alarm engaged. Rounding and hourly care complete.     Concerns for physician to address:  ECHO to be obtained due to heart murmur assessed by MD bedside.         Guerita Corrigan RN                            0125: Blood just finished. It was started at [facility] hospital ([facility] before transfer. Post blood vital signs: 128/53 BP, HR 63, Temp 98.1, SpO2 100%, RR 18. Patient stable. Nurse at last hospital stopped it in the system.   0215: Attempted to get labs x2, another nurse attempted and unsuccessful. Notified MD and was told to call rapid to attempt. Rapid notified was told it would be a wait, and to ask doctor to put in an order for an ARRT line to attempt labs. MD said no ART sticks. Phlebotomy on the floor as soon as I was about to call back. Labs drawn.    Cardiology consult called to [facility] for on call provider as well as Gastro consult called to [facility] for on call provider.    End of Shift Note    Bedside shift change report given to Nelida WRIGHT (oncoming nurse) by Amber Palacio RN (offgoing nurse).  Report included the following information SBAR, Kardex, Intake/Output, and MAR    Shift worked:  7a - 7p      Shift summary and any significant changes:    Pt tolerated care. Medications given per MAR.     Patient had one BM during shift.     Pt had no complaints of pain or nausea,    Caring rounds completed      Concerns for physician to address:      Zone phone for oncoming shift:         Activity:  Level of Assistance: Independent    Cardiac:   Cardiac Monitoring:  yes - NSR     Access:  Current line(s): PIV     Genitourinary:   Urinary Status: Voiding, Bathroom privileges    Respiratory:   O2 Device: None (Room air)    GI:  Current diet: ADULT DIET; Regular    Pain Management:   Patient states pain is manageable on current regimen: N/A    Skin:  Edenilson Scale Score: 22  Interventions: Wound Offloading (Prevention Methods): Repositioning, Pillows, Turning, Elevate heels  Pressure injury: no    Patient Safety:  Fall Score: Schreiber Total Score: 0  Fall Risk Interventions  Nursing Judgement-Fall Risk High(Add Comments): No  Toilet Every 2 Hours-In Advance of Need: No (Comment)  Hourly Visual Checks: Awake, In bed  Fall Visual Posted: Fall sign posted, Socks  Room Door Open: Deferred to decrease stimulation  Alarm On: Bed  Patient Moved Closer to Nursing Station: No    Active Consults:  IP CONSULT TO GI  IP CONSULT TO CARDIOLOGY  IP CONSULT TO CASE MANAGEMENT  IP CONSULT TO HEMATOLOGY    Length of Stay:  Expected LOS: 4  Actual LOS: 3      Amber Palacio RN    Hematology consult requested via perfect serve for on call provider Dr. Valero.    Spiritual Health History and Assessment/Progress Note  [facility]    Initial Encounter,  ,  ,      Name: Hyacinth Patterson MRN: [id number]    Age: 66 y.o.     Sex: female   Language: English   Rastafarian: Yazdanism   GIB (gastrointestinal bleeding)     Date: 6/7/2025            Total Time Calculated: 14 min              Spiritual Assessment began in [facility]        Referral/Consult From: Rounding   Encounter Overview/Reason: Initial Encounter  Service Provided For: Patient    Melanie, Belief, Meaning:   Patient identifies as spiritual, is connected with a melanie tradition or spiritual practice, and has beliefs or practices that help with coping during difficult times  Family/Friends No family/friends present      Importance and Influence:  Patient has no beliefs influential to healthcare decision-making identified during this visit  Family/Friends No family/friends present    Community:  Patient is connected with a spiritual community and expresses feelings of isolation: Other: expresses feeling lonely; family is in [place], she has a few friends in the area  Family/Friends No family/friends present    Assessment and Plan of Care:     Patient Interventions include: Facilitated expression of thoughts and feelings, Explored spiritual coping/struggle/distress, Affirmed coping skills/support systems, and Other: offered assurance of prayer  Family/Friends Interventions include: No family/friends present    Patient Plan of Care: Other: Patient would like a follow up visit tomorrow   Family/Friends Plan of Care: No family/friends present    Electronically signed by Chaplain DIONTE [facility] on 6/7/2025 at 11:04 AM     Spiritual Health History and Assessment/Progress Note  [facility]    Follow-up,  ,  ,      Name: Hyacinth Patterson MRN: [id number]    Age: 66 y.o.     Sex: female   Language: English   Spiritism: Mu-ism   GIB (gastrointestinal bleeding)     Date: 6/8/2025            Total Time Calculated: 15 min              Spiritual Assessment continued in [facility]        Referral/Consult From: Other    Encounter Overview/Reason: Follow-up  Service Provided For: Patient    Melanie, Belief, Meaning:   Patient is connected with a melanie tradition or spiritual practice and has beliefs or practices that help with coping during difficult times  Family/Friends No family/friends present      Importance and Influence:  Patient has spiritual/personal beliefs that influence decisions regarding their health  Family/Friends No family/friends present    Community:  Patient feels well-supported. Support system includes: Extended family. Although family is at a distance so doesn't anticipate visits at this time.  Family/Friends No family/friends present    Assessment and Plan of Care:     Patient Interventions include: Facilitated expression of thoughts and feelings and Affirmed coping skills/support systems. Offered prayer and conversation, encouragement and support.   Family/Friends Interventions include: No family/friends present    Patient Plan of Care: Spiritual Care available upon further referral  Family/Friends Plan of Care: No family/friends present    Electronically signed by Chaplain ROBINSON on 6/8/2025 at 12:00 PM     --   --        Signed: Nati Ugarte MD           tablet 1 tablet  1 tablet Oral Q6H PRN     Facility-Administered Medications Ordered in Other Encounters   Medication Dose Route Frequency    heparin 2 units/mL solution in 0.9% sodium chloride  200 mL Irrigation Once         and customized to this patient's unique overall circumstances and needs.  We have reviewed relevant medical records within the constraints of this admission process.   High  complexity decision making was performed for this patient who is at high risk for decompensation with multiple organ involvement.    Today total floor/unit time was 35 minutes while caring for this patient and greater than 50% of that time was spent with patient (and/or family/responsible party) coordinating patient's clinical issues; this includes time spent during multidisciplinary rounds.        Dagoberto Skinner MD MPH FACP [facility] Phy-Adv  6/11/2025       hide